# Patient Record
Sex: FEMALE | Race: WHITE | NOT HISPANIC OR LATINO | Employment: FULL TIME | URBAN - METROPOLITAN AREA
[De-identification: names, ages, dates, MRNs, and addresses within clinical notes are randomized per-mention and may not be internally consistent; named-entity substitution may affect disease eponyms.]

---

## 2017-02-01 ENCOUNTER — OFFICE VISIT (OUTPATIENT)
Dept: FAMILY MEDICINE | Facility: CLINIC | Age: 52
End: 2017-02-01
Payer: COMMERCIAL

## 2017-02-01 VITALS
SYSTOLIC BLOOD PRESSURE: 129 MMHG | BODY MASS INDEX: 29.23 KG/M2 | TEMPERATURE: 99 F | OXYGEN SATURATION: 98 % | WEIGHT: 181.88 LBS | DIASTOLIC BLOOD PRESSURE: 80 MMHG | HEART RATE: 90 BPM | HEIGHT: 66 IN

## 2017-02-01 DIAGNOSIS — B00.1 RECURRENT COLD SORES: ICD-10-CM

## 2017-02-01 DIAGNOSIS — J02.9 PHARYNGITIS, UNSPECIFIED ETIOLOGY: Primary | ICD-10-CM

## 2017-02-01 DIAGNOSIS — E78.2 HYPERLIPIDEMIA, MIXED: ICD-10-CM

## 2017-02-01 DIAGNOSIS — J04.0 LARYNGITIS: ICD-10-CM

## 2017-02-01 LAB
CTP QC/QA: YES
S PYO RRNA THROAT QL PROBE: NEGATIVE

## 2017-02-01 PROCEDURE — 99999 PR PBB SHADOW E&M-EST. PATIENT-LVL III: CPT | Mod: PBBFAC,,, | Performed by: NURSE PRACTITIONER

## 2017-02-01 PROCEDURE — 99214 OFFICE O/P EST MOD 30 MIN: CPT | Mod: S$GLB,,, | Performed by: NURSE PRACTITIONER

## 2017-02-01 RX ORDER — VALACYCLOVIR HYDROCHLORIDE 500 MG/1
500 TABLET, FILM COATED ORAL 2 TIMES DAILY
Qty: 180 TABLET | Refills: 3 | Status: SHIPPED | OUTPATIENT
Start: 2017-02-01 | End: 2018-05-02 | Stop reason: SDUPTHER

## 2017-02-01 RX ORDER — ROSUVASTATIN CALCIUM 20 MG/1
20 TABLET, COATED ORAL DAILY
Qty: 90 TABLET | Refills: 0 | Status: SHIPPED | OUTPATIENT
Start: 2017-02-01 | End: 2017-02-13 | Stop reason: SDUPTHER

## 2017-02-01 RX ORDER — FLUTICASONE PROPIONATE 50 MCG
1 SPRAY, SUSPENSION (ML) NASAL DAILY
COMMUNITY
End: 2018-05-02 | Stop reason: ALTCHOICE

## 2017-02-01 RX ORDER — PREDNISONE 20 MG/1
40 TABLET ORAL DAILY
Qty: 10 TABLET | Refills: 0 | Status: SHIPPED | OUTPATIENT
Start: 2017-02-01 | End: 2017-02-06

## 2017-02-01 RX ORDER — AZITHROMYCIN 250 MG/1
TABLET, FILM COATED ORAL
Qty: 6 TABLET | Refills: 0 | Status: SHIPPED | OUTPATIENT
Start: 2017-02-01 | End: 2018-05-02 | Stop reason: ALTCHOICE

## 2017-02-01 NOTE — MR AVS SNAPSHOT
[facility] Medicine  [id number] [place] [address]  [place] [address]  Phone: [phone]  Fax: [phone]                  Niharika Finney   2017 10:40 AM   Office Visit    Description:  Female : 1965   Provider:  Zoraida Helms NP   Department:  [facility]           Reason for Visit     URI     Medication Refill     Laryngitis           Diagnoses this Visit        Comments    Pharyngitis, unspecified etiology    -  Primary     Laryngitis         Recurrent cold sores         Hyperlipidemia, mixed                To Do List           Goals (5 Years of Data)     None      Follow-Up and Disposition     Return in about 3 months (around 2017) for fasting labs and office visit..       These Medications        Disp Refills Start End    rosuvastatin (CRESTOR) 20 MG tablet 90 tablet 0 2017    Take 1 tablet (20 mg total) by mouth once daily. - Oral    Pharmacy: [facility]/pharmacy #[id number] - [place], [place] - [address] [facility] Ph #: [phone]       valacyclovir (VALTREX) 500 MG tablet 180 tablet 3 2017     Take 1 tablet (500 mg total) by mouth 2 (two) times daily. - Oral    Pharmacy: [facility]/pharmacy #[id number] - [place] - [address] Ph #: [phone]       azithromycin (Z-TADEO) 250 MG tablet 6 tablet 0 2017     Take 2 tablets by mouth on day 1; Take 1 tablet by mouth on days 2-5    Pharmacy: [facility]/pharmacy #[id number] - [place] - [address] [facility] Ph #: [phone]       predniSONE (DELTASONE) 20 MG tablet 10 tablet 0 2017    Take 2 tablets (40 mg total) by mouth once daily. - Oral    Pharmacy: [facility]/pharmacy #[id number] - [place] - [address] Ph #: [phone]         [facility]     [facility] Care Line -  Assistance  Registered nurses in the [facility] provide clinical advisement, health education, appointment booking, and other advisory services.  Call for this free service at [phone].             Medications            Message regarding Medications     Verify the changes and/or additions to your medication regime listed below are the same as discussed with your clinician today.  If any of these changes or additions are incorrect, please notify your healthcare provider.        START taking these NEW medications        Refills    rosuvastatin (CRESTOR) 20 MG tablet 0    Sig: Take 1 tablet (20 mg total) by mouth once daily.    Class: Normal    Route: Oral    azithromycin (Z-TADEO) 250 MG tablet 0    Sig: Take 2 tablets by mouth on day 1; Take 1 tablet by mouth on days 2-5    Class: Normal    predniSONE (DELTASONE) 20 MG tablet 0    Sig: Take 2 tablets (40 mg total) by mouth once daily.    Class: Normal    Route: Oral      STOP taking these medications     brompheniramine-pseudoeph-DM 2-30-10 mg/5 mL Syrp Take 10 mLs by mouth every 4 (four) hours as needed.    atorvastatin (LIPITOR) 40 MG tablet Take 1 tablet (40 mg total) by mouth once daily.           Verify that the below list of medications is an accurate representation of the medications you are currently taking.  If none reported, the list may be blank. If incorrect, please contact your healthcare provider. Carry this list with you in case of emergency.           Current Medications     fluticasone (FLONASE) 50 mcg/actuation nasal spray 1 spray by Each Nare route once daily.    valacyclovir (VALTREX) 500 MG tablet Take 1 tablet (500 mg total) by mouth 2 (two) times daily.    azithromycin (Z-TADEO) 250 MG tablet Take 2 tablets by mouth on day 1; Take 1 tablet by mouth on days 2-5    predniSONE (DELTASONE) 20 MG tablet Take 2 tablets (40 mg total) by mouth once daily.    rosuvastatin (CRESTOR) 20 MG tablet Take 1 tablet (20 mg total) by mouth once daily.           Clinical Reference Information           Vital Signs - Last Recorded  Most recent update: 2/1/2017 10:50 AM by Dana Epstein MA    BP Pulse Temp Ht Wt SpO2    129/80 (BP Location: Right arm, Patient Position:  "Sitting, BP Method: Manual) 90 98.8 °F (37.1 °C) (Oral) 5' 6" (1.676 m) 82.5 kg (181 lb 14.1 oz) 98%    BMI                29.36 kg/m2          Blood Pressure          Most Recent Value    BP  129/80      Allergies as of 2/1/2017     No Known Allergies      Immunizations Administered on Date of Encounter - 2/1/2017     None      Orders Placed During Today's Visit      Normal Orders This Visit    POCT rapid strep A       "

## 2017-02-01 NOTE — PROGRESS NOTES
Subjective:       Patient ID: Niharika Finney is a 51 y.o. female.    Chief Complaint: URI (started last Tuesday with hoarseness sore throat, ear pain when swollow and some coughing); Medication Refill; and Laryngitis    HPI Comments: Patient is here today with report she started around 1 week ago with URI s/s but for the past 2 days, severe worsening of sore throat, painful to swallow with complete loss of voice.  See notes below.    When patient was last seen in April 2016, I started patient on Atorvastatin for elevated cholesterol levels.  Patient reports that the Atorvastatin caused myalgia and leg cramps so she stopped taking.  Patient reports she is willing to try Crestor.    Patient has recurrent cold sore and needs refill on valacyclovir.        URI    This is a new problem. Episode onset: started last Tuesday which is now 8 days ago. The problem has been unchanged. There has been no fever. Associated symptoms include congestion, coughing, ear pain, sinus pain and a sore throat. Pertinent negatives include no abdominal pain, chest pain, diarrhea, dysuria, headaches, nausea, rash, rhinorrhea, sneezing, swollen glands or vomiting. Associated symptoms comments: Sore throat much worse yesterday and today - very painful to swallow.  Complete loss of voice.. Treatments tried: Ricola, throat spray, bromfed DM. The treatment provided no relief.       Previous Medications    FLUTICASONE (FLONASE) 50 MCG/ACTUATION NASAL SPRAY    1 spray by Each Nare route once daily.    VALACYCLOVIR (VALTREX) 500 MG TABLET    Take 1 tablet (500 mg total) by mouth 2 (two) times daily.       Past Medical History   Diagnosis Date    ADD (attention deficit disorder with hyperactivity)     Back pain     Herpes labialis     History of gestational diabetes     HLD (hyperlipidemia) 3/13/2013    Impaired fasting glucose 3/13/2013    Sciatica        Past Surgical History   Procedure Laterality Date    Appendectomy      Tonsillectomy       Hernia repair  02/25/16       Family History   Problem Relation Age of Onset    Diabetes Paternal Grandmother     Pancreatic cancer Mother      passed aage 48    Pancreatic cancer Father      passed age 81    Hyperlipidemia Father     Hypertension Father     Gout Father     Cancer Sister      passed away lung cancer age 54    Hyperlipidemia Sister     Hypertension Sister     Heart disease Sister     Coronary artery disease Neg Hx        Social History     Social History    Marital status:      Spouse name: N/A    Number of children: N/A    Years of education: N/A     Social History Main Topics    Smoking status: Never Smoker    Smokeless tobacco: None    Alcohol use No    Drug use: No    Sexual activity: Not Asked     Other Topics Concern    None     Social History Narrative       Review of Systems   Constitutional: Negative for appetite change, chills, fatigue, fever and unexpected weight change.   HENT: Positive for congestion, ear pain, postnasal drip, sore throat and voice change. Negative for mouth sores, nosebleeds, rhinorrhea, sinus pressure, sneezing and trouble swallowing.    Eyes: Negative for photophobia, pain, discharge, redness, itching and visual disturbance.   Respiratory: Positive for cough. Negative for chest tightness and shortness of breath.    Cardiovascular: Negative for chest pain, palpitations and leg swelling.   Gastrointestinal: Negative for abdominal pain, blood in stool, constipation, diarrhea, nausea and vomiting.   Genitourinary: Negative for dysuria, frequency, hematuria and urgency.   Musculoskeletal: Negative for arthralgias, back pain, joint swelling and myalgias.   Skin: Negative for color change and rash.   Allergic/Immunologic: Negative for immunocompromised state.   Neurological: Negative for dizziness, seizures, syncope, weakness and headaches.   Hematological: Negative for adenopathy. Does not bruise/bleed easily.   Psychiatric/Behavioral:  "Negative for agitation, dysphoric mood, sleep disturbance and suicidal ideas. The patient is not nervous/anxious.          Objective:     Vitals:    02/01/17 1045   BP: 129/80   BP Location: Right arm   Patient Position: Sitting   BP Method: Manual   Pulse: 90   Temp: 98.8 °F (37.1 °C)   TempSrc: Oral   SpO2: 98%   Weight: 82.5 kg (181 lb 14.1 oz)   Height: 5' 6" (1.676 m)          Physical Exam   Constitutional: She is oriented to person, place, and time. She appears well-developed and well-nourished. No distress.   + obesity with Body mass index is 31.42 kg/(m^2).     HENT:   Head: Normocephalic and atraumatic.   Right Ear: External ear normal.   Left Ear: External ear normal.   Nose: Mucosal edema and rhinorrhea present.   Mouth/Throat: No uvula swelling. Posterior oropharyngeal edema and posterior oropharyngeal erythema present. No oropharyngeal exudate.       Eyes: EOM are normal. Pupils are equal, round, and reactive to light.   Neck: Normal range of motion. Neck supple. No tracheal deviation present. No thyromegaly present.   Cardiovascular: Normal rate, regular rhythm and normal heart sounds.    No murmur heard.  Pulmonary/Chest: Effort normal and breath sounds normal. No respiratory distress.   Abdominal: Soft. She exhibits no distension.   Musculoskeletal: Normal range of motion. She exhibits no edema.   Lymphadenopathy:     She has no cervical adenopathy.   Neurological: She is alert and oriented to person, place, and time. No cranial nerve deficit. Coordination normal.   Skin: Skin is warm and dry. No rash noted. She is not diaphoretic.   Psychiatric: She has a normal mood and affect.       Office Visit on 02/01/2017   Component Date Value Ref Range Status    Rapid Strep A Screen 02/01/2017 Negative  Negative Final     Acceptable 02/01/2017 Yes   Final       Assessment:         ICD-10-CM ICD-9-CM   1. Pharyngitis, unspecified etiology J02.9 462   2. Laryngitis J04.0 464.00   3. Recurrent " cold sores B00.1 054.9   4. Hyperlipidemia, mixed E78.2 272.2       Plan:       Pharyngitis, unspecified etiology  -  Strep screen negative but other URI s/s going on for over 7 days and worsening - will treat with zpak and prednisone for 5 days.  Treat symptoms with OTC medications.  -     POCT rapid strep A  -     azithromycin (Z-TADEO) 250 MG tablet; Take 2 tablets by mouth on day 1; Take 1 tablet by mouth on days 2-5  Dispense: 6 tablet; Refill: 0  -     predniSONE (DELTASONE) 20 MG tablet; Take 2 tablets (40 mg total) by mouth once daily.  Dispense: 10 tablet; Refill: 0    Laryngitis    Recurrent cold sores  -  Take the Valtrex as needed.  -     valacyclovir (VALTREX) 500 MG tablet; Take 1 tablet (500 mg total) by mouth 2 (two) times daily.  Dispense: 180 tablet; Refill: 3    Hyperlipidemia, mixed  -  Take Crestor daily.  Recheck fasting labs and office visit in 3 months.  -     rosuvastatin (CRESTOR) 20 MG tablet; Take 1 tablet (20 mg total) by mouth once daily.  Dispense: 90 tablet; Refill: 0    Return in about 3 months (around 5/1/2017) for fasting labs and office visit..     Patient's Medications   New Prescriptions    AZITHROMYCIN (Z-TADEO) 250 MG TABLET    Take 2 tablets by mouth on day 1; Take 1 tablet by mouth on days 2-5    PREDNISONE (DELTASONE) 20 MG TABLET    Take 2 tablets (40 mg total) by mouth once daily.    ROSUVASTATIN (CRESTOR) 20 MG TABLET    Take 1 tablet (20 mg total) by mouth once daily.   Previous Medications    FLUTICASONE (FLONASE) 50 MCG/ACTUATION NASAL SPRAY    1 spray by Each Nare route once daily.   Modified Medications    Modified Medication Previous Medication    VALACYCLOVIR (VALTREX) 500 MG TABLET valacyclovir (VALTREX) 500 MG tablet       Take 1 tablet (500 mg total) by mouth 2 (two) times daily.    Take 1 tablet (500 mg total) by mouth 2 (two) times daily.   Discontinued Medications    ATORVASTATIN (LIPITOR) 40 MG TABLET    Take 1 tablet (40 mg total) by mouth once daily.     BROMPHENIRAMINE-PSEUDOEPH-DM 2-30-10 MG/5 ML SYRP    Take 10 mLs by mouth every 4 (four) hours as needed.

## 2017-02-13 ENCOUNTER — TELEPHONE (OUTPATIENT)
Dept: FAMILY MEDICINE | Facility: CLINIC | Age: 52
End: 2017-02-13

## 2017-02-13 RX ORDER — ROSUVASTATIN CALCIUM 10 MG/1
10 TABLET, COATED ORAL DAILY
Qty: 90 TABLET | Refills: 0 | Status: SHIPPED | OUTPATIENT
Start: 2017-02-13 | End: 2017-05-21 | Stop reason: SDUPTHER

## 2017-02-13 NOTE — TELEPHONE ENCOUNTER
Called pt no answer left message on voice mail that medication was decrease to 10 mg and prescription was sent to pharmacy.

## 2017-02-13 NOTE — TELEPHONE ENCOUNTER
----- Message from Carmen Lynch sent at 2/13/2017  9:48 AM CST -----  Contact: [phone]/[phone]   Pt states the medication rosuvastatin (CRESTOR) 20 MG tablet is too strong and would like to know if the Dr could lower the MG.  Please advise

## 2017-05-23 RX ORDER — ROSUVASTATIN CALCIUM 10 MG/1
10 TABLET, COATED ORAL DAILY
Qty: 30 TABLET | Refills: 0 | Status: SHIPPED | OUTPATIENT
Start: 2017-05-23 | End: 2017-06-20 | Stop reason: SDUPTHER

## 2017-05-23 NOTE — TELEPHONE ENCOUNTER
Advise patient she is due for fasting labs and office visit to check her cholesterol labs since starting the Crestor - schedule appointment for labs and then visit. I filled a 1 month supply

## 2017-06-20 ENCOUNTER — PATIENT MESSAGE (OUTPATIENT)
Dept: FAMILY MEDICINE | Facility: CLINIC | Age: 52
End: 2017-06-20

## 2017-06-20 RX ORDER — ROSUVASTATIN CALCIUM 10 MG/1
10 TABLET, COATED ORAL DAILY
Qty: 30 TABLET | Refills: 3 | Status: SHIPPED | OUTPATIENT
Start: 2017-06-20 | End: 2017-12-19 | Stop reason: SDUPTHER

## 2017-12-19 RX ORDER — ROSUVASTATIN CALCIUM 10 MG/1
10 TABLET, COATED ORAL DAILY
Qty: 30 TABLET | Refills: 0 | Status: SHIPPED | OUTPATIENT
Start: 2017-12-19 | End: 2018-05-02 | Stop reason: SDUPTHER

## 2017-12-20 NOTE — TELEPHONE ENCOUNTER
----- Message from Nando Mackey sent at 12/20/2017 12:39 PM CST -----  Contact: [phone] [facility]  Pharmacy advised the patient insurance is requesting a 3 month supply for the rosuvastatin (CRESTOR) 10 MG tablet. Please call and advise.

## 2018-03-15 ENCOUNTER — TELEPHONE (OUTPATIENT)
Dept: FAMILY MEDICINE | Facility: CLINIC | Age: 53
End: 2018-03-15

## 2018-03-15 NOTE — TELEPHONE ENCOUNTER
Advise patient that we have a new orthopedic MD in [place] that lower extremities are his specialty - Dr. Burk - that is who I would recommend seeing - jeremy can help her to schedule new patient appointment with him.

## 2018-03-15 NOTE — TELEPHONE ENCOUNTER
----- Message from Paige Hill sent at 3/15/2018  8:26 AM CDT -----  No. [phone]    Patient's right ankle is swollen.  Also, she has pain in the back of her left knee and left leg.   It is painful for her to walk.   What physician should she see.   Please call.

## 2018-04-27 LAB
CHOLEST SERPL-MSCNC: 245 MG/DL (ref 0–200)
GLUCOSE SERPL-MCNC: 97 MG/DL (ref 70–110)
HDLC SERPL-MCNC: 45 MG/DL
LDLC SERPL CALC-MCNC: 170 MG/DL
TOTAL HDL-C DIRECT: 5.5
TRIGL SERPL-MCNC: 150 MG/DL

## 2018-05-02 ENCOUNTER — TELEPHONE (OUTPATIENT)
Dept: ADMINISTRATIVE | Facility: HOSPITAL | Age: 53
End: 2018-05-02

## 2018-05-02 ENCOUNTER — OFFICE VISIT (OUTPATIENT)
Dept: FAMILY MEDICINE | Facility: CLINIC | Age: 53
End: 2018-05-02
Payer: COMMERCIAL

## 2018-05-02 VITALS
SYSTOLIC BLOOD PRESSURE: 126 MMHG | WEIGHT: 206.25 LBS | HEIGHT: 66 IN | DIASTOLIC BLOOD PRESSURE: 86 MMHG | HEART RATE: 80 BPM | TEMPERATURE: 99 F | OXYGEN SATURATION: 98 % | BODY MASS INDEX: 33.15 KG/M2

## 2018-05-02 DIAGNOSIS — Z12.39 BREAST CANCER SCREENING: ICD-10-CM

## 2018-05-02 DIAGNOSIS — Z12.11 COLON CANCER SCREENING: ICD-10-CM

## 2018-05-02 DIAGNOSIS — E78.2 HYPERLIPIDEMIA, MIXED: Primary | ICD-10-CM

## 2018-05-02 DIAGNOSIS — B00.1 RECURRENT COLD SORES: ICD-10-CM

## 2018-05-02 PROCEDURE — 99999 PR PBB SHADOW E&M-EST. PATIENT-LVL V: CPT | Mod: PBBFAC,,, | Performed by: NURSE PRACTITIONER

## 2018-05-02 PROCEDURE — 99214 OFFICE O/P EST MOD 30 MIN: CPT | Mod: SA,S$GLB,, | Performed by: NURSE PRACTITIONER

## 2018-05-02 RX ORDER — ROSUVASTATIN CALCIUM 10 MG/1
10 TABLET, COATED ORAL DAILY
Qty: 90 TABLET | Refills: 1 | Status: SHIPPED | OUTPATIENT
Start: 2018-05-02 | End: 2018-11-07 | Stop reason: SDUPTHER

## 2018-05-02 RX ORDER — VALACYCLOVIR HYDROCHLORIDE 500 MG/1
500 TABLET, FILM COATED ORAL 2 TIMES DAILY
Qty: 180 TABLET | Refills: 3 | Status: SHIPPED | OUTPATIENT
Start: 2018-05-02 | End: 2019-12-05 | Stop reason: SDUPTHER

## 2018-05-02 NOTE — PROGRESS NOTES
Subjective:       Patient ID: Niharika Finney is a 52 y.o. female.    Chief Complaint: Medication Refill    Patient is a 52-year-old white female with a history of gestational diabetes, hyperlipidemia, and recurrent cold sores that is here today to follow-up after wellness exam done at [facility].    Patient has hyperlipidemia and was started on Crestor 10 mg back in February 2017 the patient did not return for follow-up visit and repeat lab work so medication was not filled and patient had to stop taking.  On wellness exam with [facility] her total cholesterol was 245 with LDL level 170.  HDL was 45 with triglycerides 150.  Again patient was off of medication because she had ran out.  The [facility] does not do kidney and liver function exams.  Advised patient I will put her back on Crestor 10 mg daily but she must follow-up with fasting CMP and lipid panel in the next 3 months to reassess.    Patient has recurrent cold sores and takes valacyclovir as needed.    Patient is overdue for colonoscopy screening.  Like to have done in [place].  Will send referral to Dr. Burk.    Patient is due for mammogram.  Mammogram order given.  States she will schedule in [place].        Previous Medications    ROSUVASTATIN (CRESTOR) 10 MG TABLET    Take 1 tablet (10 mg total) by mouth once daily.    VALACYCLOVIR (VALTREX) 500 MG TABLET    Take 1 tablet (500 mg total) by mouth 2 (two) times daily.       Past Medical History:   Diagnosis Date    ADD (attention deficit disorder with hyperactivity)     Back pain     Herpes labialis     History of gestational diabetes     HLD (hyperlipidemia) 3/13/2013    Impaired fasting glucose 3/13/2013    Recurrent cold sores     Sciatica        Past Surgical History:   Procedure Laterality Date    APPENDECTOMY      HERNIA REPAIR  02/25/16    TONSILLECTOMY         Family History   Problem Relation Age of Onset    Diabetes Paternal Grandmother     Pancreatic cancer  Mother      passed aage 48    Pancreatic cancer Father      passed age 81    Hyperlipidemia Father     Hypertension Father     Gout Father     Cancer Sister      passed away lung cancer age 54    Hyperlipidemia Sister     Hypertension Sister     Heart disease Sister     Coronary artery disease Neg Hx        Social History     Social History    Marital status:      Spouse name: N/A    Number of children: N/A    Years of education: N/A     Social History Main Topics    Smoking status: Never Smoker    Smokeless tobacco: None    Alcohol use No    Drug use: No    Sexual activity: Not Asked     Other Topics Concern    None     Social History Narrative    None       Review of Systems   Constitutional: Negative for appetite change, chills, fatigue, fever and unexpected weight change.   HENT: Negative for congestion, ear pain, mouth sores, nosebleeds, postnasal drip, rhinorrhea, sinus pressure, sneezing, sore throat, trouble swallowing and voice change.    Eyes: Negative for photophobia, pain, discharge, redness, itching and visual disturbance.   Respiratory: Negative for cough, chest tightness and shortness of breath.    Cardiovascular: Negative for chest pain, palpitations and leg swelling.   Gastrointestinal: Negative for abdominal pain, blood in stool, constipation, diarrhea, nausea and vomiting.   Genitourinary: Negative for dysuria, frequency, hematuria and urgency.   Musculoskeletal: Negative for arthralgias, back pain, joint swelling and myalgias.   Skin: Negative for color change and rash.   Allergic/Immunologic: Negative for immunocompromised state.   Neurological: Negative for dizziness, seizures, syncope, weakness and headaches.   Hematological: Negative for adenopathy. Does not bruise/bleed easily.   Psychiatric/Behavioral: Negative for agitation, dysphoric mood, sleep disturbance and suicidal ideas. The patient is not nervous/anxious.          Objective:     Vitals:    05/02/18 0849  "05/02/18 0919   BP: (!) 128/94 126/86   BP Location: Right arm    Patient Position: Sitting    BP Method: Medium (Manual)    Pulse: 80    Temp: 98.7 °F (37.1 °C)    TempSrc: Oral    SpO2: 98%    Weight: 93.6 kg (206 lb 3.9 oz)    Height: 5' 6" (1.676 m)           Physical Exam   Constitutional: She is oriented to person, place, and time. She appears well-developed and well-nourished.   + obesity with Body mass index is 33.29 kg/m².   HENT:   Head: Normocephalic and atraumatic.   Right Ear: External ear normal.   Left Ear: External ear normal.   Nose: Nose normal.   Mouth/Throat: Oropharynx is clear and moist. No oropharyngeal exudate.   Eyes: EOM are normal. Pupils are equal, round, and reactive to light.   Neck: Normal range of motion. Neck supple. No tracheal deviation present. No thyromegaly present.   Cardiovascular: Normal rate, regular rhythm and normal heart sounds.    No murmur heard.  Pulmonary/Chest: Effort normal and breath sounds normal. No respiratory distress.   Abdominal: Soft. She exhibits no distension.   Musculoskeletal: Normal range of motion. She exhibits no edema.   Lymphadenopathy:     She has no cervical adenopathy.   Neurological: She is alert and oriented to person, place, and time. No cranial nerve deficit. Coordination normal.   Skin: Skin is warm and dry. No rash noted.   Psychiatric: She has a normal mood and affect.         Assessment:         ICD-10-CM ICD-9-CM   1. Hyperlipidemia, mixed E78.2 272.2   2. Recurrent cold sores B00.1 054.9   3. Breast cancer screening Z12.31 V76.10   4. Colon cancer screening Z12.11 V76.51       Plan:       Hyperlipidemia, mixed  -  Get back on Crestor 10 mg daily and take every day.  [facility] and return to office for follow-up in 3 months.  -     rosuvastatin (CRESTOR) 10 MG tablet; Take 1 tablet (10 mg total) by mouth once daily.  Dispense: 90 tablet; Refill: 1  -     Comprehensive metabolic panel; Future; Expected date: 08/02/2018  -   "   Lipid panel; Future; Expected date: 08/02/2018    Recurrent cold sores  -  Take as needed  -     valACYclovir (VALTREX) 500 MG tablet; Take 1 tablet (500 mg total) by mouth 2 (two) times daily.  Dispense: 180 tablet; Refill: 3    Breast cancer screening  -  Call and schedule at [facility]  -     Mammo Digital Screening Bilateral With CAD; Future; Expected date: 05/02/2018    Colon cancer screening  -  Message sent over portal to Dr. Burk staff to contact patient to schedule an [facility].  -     Ambulatory Referral to Gastroenterology      Follow-up in about 3 months (around 8/2/2018) for fasting labs at [facility] and then follow up for results..     Patient's Medications   New Prescriptions    No medications on file   Previous Medications    No medications on file   Modified Medications    Modified Medication Previous Medication    ROSUVASTATIN (CRESTOR) 10 MG TABLET rosuvastatin (CRESTOR) 10 MG tablet       Take 1 tablet (10 mg total) by mouth once daily.    Take 1 tablet (10 mg total) by mouth once daily.    VALACYCLOVIR (VALTREX) 500 MG TABLET valacyclovir (VALTREX) 500 MG tablet       Take 1 tablet (500 mg total) by mouth 2 (two) times daily.    Take 1 tablet (500 mg total) by mouth 2 (two) times daily.   Discontinued Medications    AZITHROMYCIN (Z-TADEO) 250 MG TABLET    Take 2 tablets by mouth on day 1; Take 1 tablet by mouth on days 2-5    FLUTICASONE (FLONASE) 50 MCG/ACTUATION NASAL SPRAY    1 spray by Each Nare route once daily.

## 2018-05-09 ENCOUNTER — TELEPHONE (OUTPATIENT)
Dept: GASTROENTEROLOGY | Facility: CLINIC | Age: 53
End: 2018-05-09

## 2018-11-07 DIAGNOSIS — E78.2 HYPERLIPIDEMIA, MIXED: ICD-10-CM

## 2018-11-07 RX ORDER — ROSUVASTATIN CALCIUM 10 MG/1
TABLET, COATED ORAL
Qty: 90 TABLET | Refills: 0 | Status: SHIPPED | OUTPATIENT
Start: 2018-11-07 | End: 2019-02-20 | Stop reason: ALTCHOICE

## 2018-11-14 ENCOUNTER — TELEPHONE (OUTPATIENT)
Dept: FAMILY MEDICINE | Facility: CLINIC | Age: 53
End: 2018-11-14

## 2018-11-14 NOTE — TELEPHONE ENCOUNTER
----- Message from Nikita Epstein sent at 11/14/2018 12:45 PM CST -----  Contact: [phone]  Patient called to speak with your office about the issues she has been having for the past week.  Diarrhea, nausea, throwing up and body aches among other things is what she is experiencing.    Please call [phone] and advise what she needs to do to get better.

## 2018-11-15 NOTE — TELEPHONE ENCOUNTER
Called patient with no answer - I see I have a 10:20 open tomorrow but would not let me schedule it because it states [facility] [employer] and will not let me schedule even though I put in as same day appt.  If it is still available - schedule her appt.  If not available - check to see if Dr. Jackson has any openings to see patient please

## 2018-11-15 NOTE — TELEPHONE ENCOUNTER
Called patient about no openings today with cheli - told patient I could schedule her with dr alfaro. Patient stated she would have to call us back this afternoon if she decides.  I explained I could make it now and always cancel the appt, patient state no she would call us back

## 2019-02-20 ENCOUNTER — OFFICE VISIT (OUTPATIENT)
Dept: FAMILY MEDICINE | Facility: CLINIC | Age: 54
End: 2019-02-20
Payer: COMMERCIAL

## 2019-02-20 VITALS
BODY MASS INDEX: 33.55 KG/M2 | HEIGHT: 66 IN | TEMPERATURE: 98 F | OXYGEN SATURATION: 98 % | RESPIRATION RATE: 18 BRPM | DIASTOLIC BLOOD PRESSURE: 88 MMHG | WEIGHT: 208.75 LBS | HEART RATE: 89 BPM | SYSTOLIC BLOOD PRESSURE: 146 MMHG

## 2019-02-20 DIAGNOSIS — B00.1 RECURRENT COLD SORES: ICD-10-CM

## 2019-02-20 DIAGNOSIS — Z13.1 DIABETES MELLITUS SCREENING: ICD-10-CM

## 2019-02-20 DIAGNOSIS — Z13.29 THYROID DISORDER SCREEN: ICD-10-CM

## 2019-02-20 DIAGNOSIS — J18.9 COMMUNITY ACQUIRED PNEUMONIA, UNSPECIFIED LATERALITY: Primary | ICD-10-CM

## 2019-02-20 DIAGNOSIS — Z12.11 COLON CANCER SCREENING: ICD-10-CM

## 2019-02-20 DIAGNOSIS — E78.2 HYPERLIPIDEMIA, MIXED: ICD-10-CM

## 2019-02-20 DIAGNOSIS — Z13.0 SCREENING FOR DEFICIENCY ANEMIA: ICD-10-CM

## 2019-02-20 PROCEDURE — 99214 PR OFFICE/OUTPT VISIT, EST, LEVL IV, 30-39 MIN: ICD-10-PCS | Mod: S$GLB,,, | Performed by: NURSE PRACTITIONER

## 2019-02-20 PROCEDURE — 99214 OFFICE O/P EST MOD 30 MIN: CPT | Mod: S$GLB,,, | Performed by: NURSE PRACTITIONER

## 2019-02-20 PROCEDURE — 99999 PR PBB SHADOW E&M-EST. PATIENT-LVL IV: CPT | Mod: PBBFAC,,, | Performed by: NURSE PRACTITIONER

## 2019-02-20 PROCEDURE — 3008F BODY MASS INDEX DOCD: CPT | Mod: CPTII,S$GLB,, | Performed by: NURSE PRACTITIONER

## 2019-02-20 PROCEDURE — 99999 PR PBB SHADOW E&M-EST. PATIENT-LVL IV: ICD-10-PCS | Mod: PBBFAC,,, | Performed by: NURSE PRACTITIONER

## 2019-02-20 PROCEDURE — 3008F PR BODY MASS INDEX (BMI) DOCUMENTED: ICD-10-PCS | Mod: CPTII,S$GLB,, | Performed by: NURSE PRACTITIONER

## 2019-02-20 RX ORDER — AZITHROMYCIN 250 MG/1
TABLET, FILM COATED ORAL
Refills: 0 | COMMUNITY
Start: 2019-02-17 | End: 2019-03-12 | Stop reason: ALTCHOICE

## 2019-02-20 RX ORDER — BROMPHENIRAMINE MALEATE, PSEUDOEPHEDRINE HYDROCHLORIDE, AND DEXTROMETHORPHAN HYDROBROMIDE 2; 30; 10 MG/5ML; MG/5ML; MG/5ML
10 SYRUP ORAL EVERY 4 HOURS PRN
Qty: 240 ML | Refills: 0 | Status: SHIPPED | OUTPATIENT
Start: 2019-02-20 | End: 2019-03-12 | Stop reason: ALTCHOICE

## 2019-02-20 NOTE — PROGRESS NOTES
Subjective:       Patient ID: Niharika Finney is a 53 y.o. female.    Chief Complaint: Pneumonia    Patient is a 52-year-old white female with a history of gestational diabetes, hyperlipidemia, and recurrent cold sores that is here today with report she was diagnosed with Pneumonia at [facility] and here for follow up.    Patient reports she started on Friday 2/15/2019 with report of nonproductive cough and chills, no fever.  By Sunday, 2/17/2019 - reports temperature of 102.5 with worsening of symptoms.  Her son was diagnosed with the FLU earlier in the week.  Patient went to [facility]. She reports her flu swab was negative but Chest xray showed patchy and nodular opacity projected over the lingula.  Atelectasis or infiltrate or scarring is possible.  Lung nodule not excluded.  Was given a Rocephin 1 gram injection at urgent Care and discharged on po Zithromax.  Patient reports the fever resolved on yesterday.  Reports that today is the first day that she is feeling somewhat better but still has chest pains with cough.  HR and pulse ox stable.  BBS CTA.  Will continue the Zpak until completed.  Treat the symptoms of URI with Bromfed DM and Ibuprofen and see patient back in 2 weeks with repeat CXR.  Return here if symptoms worsen prior to this follow up.  OFF WORK WITH REST AND INCREASED FLUIDS UNTIL Monday 2/25/2019.    Patient has Hyperlipidemia.  She reports she was intolerant to Atorvastatin and Rosuvastatin due to body aches/joint pains.  She has not tried other statins.  Recommended patient return for fasting labs to reassess and try a different statin such as simvastatin or Zetia.  Patient states that she will go get labs and return for WELLNESS EXAM to discuss.    Due for mammogram.  Due for PAP - advised to make appt.    Due for colon cancer screen.  Refused colonoscopy but agrees to fit testing. FitKit was given to patient on 2/20/2019 3:01 PM             Current Outpatient  Medications   Medication Sig Dispense Refill    azithromycin (Z-TADEO) 250 MG tablet TABLETS BY MOUTH TAKE 2 TABLETS BY MOUTH ON DAY 1, TAKE 1 TABLET BY MOUTH DAYS 2-5  0    valACYclovir (VALTREX) 500 MG tablet Take 1 tablet (500 mg total) by mouth 2 (two) times daily. 180 tablet 3     No current facility-administered medications for this visit.        Past Medical History:   Diagnosis Date    ADD (attention deficit disorder with hyperactivity)     Back pain     Herpes labialis     History of gestational diabetes     HLD (hyperlipidemia) 3/13/2013    Impaired fasting glucose 3/13/2013    Recurrent cold sores     Sciatica        Past Surgical History:   Procedure Laterality Date    APPENDECTOMY      HERNIA REPAIR  02/25/16    REPAIR-HERNIA-INCISIONAL ventral ex mesh N/A 1/28/2016    [facility]    TONSILLECTOMY         Family History   Problem Relation Age of Onset    Diabetes Paternal Grandmother     Pancreatic cancer Mother         passed aage 48    Pancreatic cancer Father         passed age 81    Hyperlipidemia Father     Hypertension Father     Gout Father     Cancer Sister         passed away lung cancer age 54    Hyperlipidemia Sister     Hypertension Sister     Heart disease Sister     Coronary artery disease Neg Hx        Social History     Socioeconomic History    Marital status:      Spouse name: None    Number of children: None    Years of education: None    Highest education level: None   Social Needs    Financial resource strain: None    Food insecurity - worry: None    Food insecurity - inability: None    Transportation needs - medical: None    Transportation needs - non-medical: None   Occupational History    None   Tobacco Use    Smoking status: Never Smoker    Smokeless tobacco: Never Used   Substance and Sexual Activity    Alcohol use: No    Drug use: No    Sexual activity: None   Other Topics Concern    None   Social  "History Narrative    None       Review of Systems   Constitutional: Positive for fatigue. Negative for appetite change, chills, fever and unexpected weight change.   HENT: Positive for congestion, postnasal drip and sinus pressure. Negative for ear pain, mouth sores, nosebleeds, rhinorrhea, sneezing, sore throat, trouble swallowing and voice change.    Eyes: Negative for photophobia, pain, discharge, redness, itching and visual disturbance.   Respiratory: Positive for cough and chest tightness. Negative for shortness of breath.    Cardiovascular: Positive for chest pain. Negative for palpitations and leg swelling.        Chest pain is with cough   Gastrointestinal: Negative for abdominal pain, blood in stool, constipation, diarrhea, nausea and vomiting.   Genitourinary: Negative for dysuria, frequency, hematuria and urgency.   Musculoskeletal: Negative for arthralgias, back pain, joint swelling and myalgias.   Skin: Negative for color change and rash.   Allergic/Immunologic: Negative for immunocompromised state.   Neurological: Negative for dizziness, seizures, syncope, weakness and headaches.   Hematological: Negative for adenopathy. Does not bruise/bleed easily.   Psychiatric/Behavioral: Negative for agitation, dysphoric mood, sleep disturbance and suicidal ideas. The patient is not nervous/anxious.          Objective:     Vitals:    02/20/19 1409 02/20/19 1410   BP: (!) 148/92 (!) 146/88   BP Location: Left arm Right arm   Patient Position: Sitting Sitting   BP Method: Large (Manual) Large (Manual)   Pulse: 89    Resp: 18    Temp: 98.3 °F (36.8 °C)    TempSrc: Oral    SpO2: 98%    Weight: 94.7 kg (208 lb 12.4 oz)    Height: 5' 6" (1.676 m)           Physical Exam   Constitutional: She is oriented to person, place, and time. She appears well-developed and well-nourished.   + obesity with Body mass index is 33.7 kg/m².     HENT:   Head: Normocephalic and atraumatic.   Right Ear: External ear normal.   Left Ear: " External ear normal.   Nose: Mucosal edema and rhinorrhea present.   Mouth/Throat: Posterior oropharyngeal erythema present. No oropharyngeal exudate or posterior oropharyngeal edema.   Eyes: EOM are normal. Pupils are equal, round, and reactive to light.   Neck: Normal range of motion. Neck supple. No tracheal deviation present. No thyromegaly present.   Cardiovascular: Normal rate, regular rhythm and normal heart sounds.   No murmur heard.  Pulmonary/Chest: Effort normal and breath sounds normal. No stridor. No tachypnea. No respiratory distress. She has no wheezes. She has no rhonchi. She has no rales.   BBS CTA.  Pulse ox 98% RA.  She does have an expiratory-exacerbated cough - indicator of bronchitis.     Abdominal: Soft. She exhibits no distension.   Musculoskeletal: Normal range of motion. She exhibits no edema.   Lymphadenopathy:     She has no cervical adenopathy.   Neurological: She is alert and oriented to person, place, and time. No cranial nerve deficit. Coordination normal.   Skin: Skin is warm and dry. No rash noted.   Psychiatric: She has a normal mood and affect.         Assessment:         ICD-10-CM ICD-9-CM   1. Community acquired pneumonia, unspecified laterality J18.9 486   2. Hyperlipidemia, mixed E78.2 272.2   3. Recurrent cold sores B00.1 054.9   4. Colon cancer screening Z12.11 V76.51   5. Screening for deficiency anemia Z13.0 V78.1   6. Thyroid disorder screen Z13.29 V77.0   7. Diabetes mellitus screening Z13.1 V77.1       Plan:       Community acquired pneumonia, unspecified laterality  -  Finish off the Zpak.  HOME UNTIL Monday WITH PLENTY OF REST AND FLUIDS.  Take Bromfed DM and Ibuprofen for symptom management.  Use Flonase to decrease the nasal turbinate swelling.  -     X-Ray Chest PA And Lateral; Future; Expected date: 02/20/2019  -     brompheniramine-pseudoeph-DM (BROMFED DM) 2-30-10 mg/5 mL Syrp; Take 10 mLs by mouth every 4 (four) hours as needed (congestion/cough).  Dispense:  240 mL; Refill: 0    Hyperlipidemia, mixed  -  Get labs in 2 weeks for fasting labs and wellness exam  -     Lipid panel; Future; Expected date: 02/20/2019    Recurrent cold sores  -  Take valtrex prn    Colon cancer screening  -  Fit test given to patient and asked her to complete prior to next visit.  -     Fecal Immunochemical Test (iFOBT); Future; Expected date: 02/20/2019    Screening for deficiency anemia  -     CBC auto differential; Future; Expected date: 02/20/2019    Thyroid disorder screen  -     TSH; Future; Expected date: 02/20/2019    Diabetes mellitus screening  -     Comprehensive metabolic panel; Future; Expected date: 02/20/2019      Follow-up for 2 weeks for fasting labs and then WELLNESS EXAM.     Patient's Medications   New Prescriptions    BROMPHENIRAMINE-PSEUDOEPH-DM (BROMFED DM) 2-30-10 MG/5 ML SYRP    Take 10 mLs by mouth every 4 (four) hours as needed (congestion/cough).   Previous Medications    AZITHROMYCIN (Z-TADEO) 250 MG TABLET    TABLETS BY MOUTH TAKE 2 TABLETS BY MOUTH ON DAY 1, TAKE 1 TABLET BY MOUTH DAYS 2-5    VALACYCLOVIR (VALTREX) 500 MG TABLET    Take 1 tablet (500 mg total) by mouth 2 (two) times daily.   Modified Medications    No medications on file   Discontinued Medications    ROSUVASTATIN (CRESTOR) 10 MG TABLET    TAKE 1 TABLET EVERY DAY

## 2019-03-08 ENCOUNTER — HOSPITAL ENCOUNTER (OUTPATIENT)
Dept: RADIOLOGY | Facility: HOSPITAL | Age: 54
Discharge: HOME | End: 2019-03-08
Attending: NURSE PRACTITIONER
Payer: COMMERCIAL

## 2019-03-08 DIAGNOSIS — J18.9 COMMUNITY ACQUIRED PNEUMONIA, UNSPECIFIED LATERALITY: ICD-10-CM

## 2019-03-08 DIAGNOSIS — Z12.39 BREAST CANCER SCREENING: ICD-10-CM

## 2019-03-08 PROCEDURE — 77067 SCR MAMMO BI INCL CAD: CPT | Mod: TC,PO

## 2019-03-08 PROCEDURE — 71046 X-RAY EXAM CHEST 2 VIEWS: CPT | Mod: TC,FY,PO

## 2019-03-09 LAB
ALBUMIN SERPL-MCNC: 4 G/DL (ref 3.6–5.1)
ALBUMIN/GLOB SERPL: 1.7 (CALC) (ref 1–2.5)
ALP SERPL-CCNC: 96 U/L (ref 33–130)
ALT SERPL-CCNC: 272 U/L (ref 6–29)
AST SERPL-CCNC: 190 U/L (ref 10–35)
BILIRUB SERPL-MCNC: 0.8 MG/DL (ref 0.2–1.2)
BUN SERPL-MCNC: 17 MG/DL (ref 7–25)
BUN/CREAT SERPL: ABNORMAL (CALC) (ref 6–22)
CALCIUM SERPL-MCNC: 9.1 MG/DL (ref 8.6–10.4)
CHLORIDE SERPL-SCNC: 106 MMOL/L (ref 98–110)
CHOLEST SERPL-MCNC: 225 MG/DL
CHOLEST/HDLC SERPL: 4.6 (CALC)
CO2 SERPL-SCNC: 26 MMOL/L (ref 20–32)
CREAT SERPL-MCNC: 0.65 MG/DL (ref 0.5–1.05)
GFRSERPLBLD MDRD-ARVRAT: 102 ML/MIN/1.73M2
GLOBULIN SER CALC-MCNC: 2.4 G/DL (CALC) (ref 1.9–3.7)
GLUCOSE SERPL-MCNC: 114 MG/DL (ref 65–99)
HDLC SERPL-MCNC: 49 MG/DL
LDLC SERPL CALC-MCNC: 151 MG/DL (CALC)
NONHDLC SERPL-MCNC: 176 MG/DL (CALC)
POTASSIUM SERPL-SCNC: 4 MMOL/L (ref 3.5–5.3)
PROT SERPL-MCNC: 6.4 G/DL (ref 6.1–8.1)
SODIUM SERPL-SCNC: 140 MMOL/L (ref 135–146)
TRIGL SERPL-MCNC: 127 MG/DL

## 2019-03-12 ENCOUNTER — OFFICE VISIT (OUTPATIENT)
Dept: FAMILY MEDICINE | Facility: CLINIC | Age: 54
End: 2019-03-12
Payer: COMMERCIAL

## 2019-03-12 VITALS
BODY MASS INDEX: 35.51 KG/M2 | SYSTOLIC BLOOD PRESSURE: 134 MMHG | TEMPERATURE: 98 F | OXYGEN SATURATION: 98 % | HEART RATE: 71 BPM | DIASTOLIC BLOOD PRESSURE: 86 MMHG | WEIGHT: 208 LBS | RESPIRATION RATE: 18 BRPM | HEIGHT: 64 IN

## 2019-03-12 DIAGNOSIS — Z87.01 HISTORY OF PNEUMONIA: ICD-10-CM

## 2019-03-12 DIAGNOSIS — R79.89 ELEVATED LFTS: ICD-10-CM

## 2019-03-12 DIAGNOSIS — R03.0 BORDERLINE HIGH BLOOD PRESSURE: ICD-10-CM

## 2019-03-12 DIAGNOSIS — E78.2 HYPERLIPIDEMIA, MIXED: ICD-10-CM

## 2019-03-12 DIAGNOSIS — Z23 NEED FOR STREPTOCOCCUS PNEUMONIAE VACCINATION: ICD-10-CM

## 2019-03-12 DIAGNOSIS — Z13.0 SCREENING FOR DEFICIENCY ANEMIA: ICD-10-CM

## 2019-03-12 DIAGNOSIS — R73.01 IFG (IMPAIRED FASTING GLUCOSE): ICD-10-CM

## 2019-03-12 DIAGNOSIS — Z00.00 ANNUAL PHYSICAL EXAM: Primary | ICD-10-CM

## 2019-03-12 DIAGNOSIS — B00.1 RECURRENT COLD SORES: ICD-10-CM

## 2019-03-12 DIAGNOSIS — Z13.29 THYROID DISORDER SCREEN: ICD-10-CM

## 2019-03-12 DIAGNOSIS — Z12.4 CERVICAL CANCER SCREENING: ICD-10-CM

## 2019-03-12 PROCEDURE — 99999 PR PBB SHADOW E&M-EST. PATIENT-LVL V: CPT | Mod: PBBFAC,,, | Performed by: NURSE PRACTITIONER

## 2019-03-12 PROCEDURE — 90471 PNEUMOCOCCAL POLYSACCHARIDE VACCINE 23-VALENT =>2YO SQ IM: ICD-10-PCS | Mod: S$GLB,,, | Performed by: NURSE PRACTITIONER

## 2019-03-12 PROCEDURE — 99396 PREV VISIT EST AGE 40-64: CPT | Mod: 25,S$GLB,, | Performed by: NURSE PRACTITIONER

## 2019-03-12 PROCEDURE — 99999 PR PBB SHADOW E&M-EST. PATIENT-LVL V: ICD-10-PCS | Mod: PBBFAC,,, | Performed by: NURSE PRACTITIONER

## 2019-03-12 PROCEDURE — 90471 IMMUNIZATION ADMIN: CPT | Mod: S$GLB,,, | Performed by: NURSE PRACTITIONER

## 2019-03-12 PROCEDURE — 90732 PPSV23 VACC 2 YRS+ SUBQ/IM: CPT | Mod: S$GLB,,, | Performed by: NURSE PRACTITIONER

## 2019-03-12 PROCEDURE — 99396 PR PREVENTIVE VISIT,EST,40-64: ICD-10-PCS | Mod: 25,S$GLB,, | Performed by: NURSE PRACTITIONER

## 2019-03-12 PROCEDURE — 90732 PNEUMOCOCCAL POLYSACCHARIDE VACCINE 23-VALENT =>2YO SQ IM: ICD-10-PCS | Mod: S$GLB,,, | Performed by: NURSE PRACTITIONER

## 2019-03-12 RX ORDER — ONDANSETRON 4 MG/1
8 TABLET, FILM COATED ORAL 2 TIMES DAILY PRN
COMMUNITY
Start: 2017-10-19 | End: 2019-03-12 | Stop reason: ALTCHOICE

## 2019-03-12 NOTE — PROGRESS NOTES
Subjective:       Patient ID: Niharika Finney is a 53 y.o. female.    Chief Complaint: Annual Exam    Patient is a 53-year-old white female with a history of gestational diabetes, hyperlipidemia, recurrent cold sores, and a history of Pneumonia in Feb. 2019 that is here today for annual physical exam with fasting lab results.    Patient was diagnosed with Pneumonia by Urgent Care on 2/15/19.  I did a repeat CXR to ensure pneumonia is resolved and lungs are clear.  Will give Pneumonia vaccine today.    Patient has a history of Gestational Diabetes.  She now has an .  Advised on lifestyle modifications.    Patient has history of Hyperlipidemia.  She was put on Atorvastatin and Rosuvastatin in the past but stopped taking medication due to myalgias and has not been on medication for the past year.  Total cholesterol is high at 225 with an .  However patient's liver enzymes are high at this point so we will not address cholesterol until liver enzymes have lowered.    Patient has elevated liver enzymes with an AST of 190 and an ALT of 272.  Patient has not had any recent blood work to evaluate liver functions so unsure if this is a new finding or not.  Patient was recently ill with pneumonia and was taking over-the-counter medicines.  Advised patient to avoid all Tylenol products.  Patient drinks rarely but told to avoid all alcohol as well and we will recheck liver enzymes in 4 weeks.    Patient has recurrent cold sores and takes Valtrex as needed but has not required this medicine in several months.    Wellness labs:  -  patient was scheduled for wellness labs at [facility] but patient had went to [facility] in [facility] which had whole lab orders for CMP and lipid so that was all that was done.  I will get a CBC and TSH for screening with her next blood draw.  -  CMP shows normal kidney function.  Patient does have an impaired fasting glucose of 114 and elevated liver enzymes discussed above  -   cholesterol levels discussed above    Health maintenance:  -  patient refuses colonoscopy but agree to fit kit back in February 2019 visit the patient has not turned in.  Advised patient to turn fit kit in within the next month.  -  mammogram was completed and is normal.  -  patient is aware that she needs to make an appointment for Pap smear        Component      Latest Ref Rng & Units 3/8/2019 4/27/2018 3/6/2013   WBC      4.8 - 10.8 K/uL   5.68   RBC      4.00 - 5.40 M/uL   4.49   Hemoglobin      12.0 - 16.0 gm/dl   13.7   Hematocrit      37.0 - 48.5 %   40.8   MCV      82 - 95 fL   90.9   MCH      27 - 31 pg   30.5   MCHC      32 - 36 %   33.6   RDW      11.5 - 14.5 %   12.6   Gran%      38 - 73 %   52.5   Lymph%      21 - 44 %   38.7   Mono%      0.0 - 7.4 %   7.0   Eosinophil%      0.0 - 4.2 %   1.2   Basophil%      0 - 1.9 %   0.4   Gran # (ANC)      1.8 - 7.7 K/uL   3.0   Lymph #      1 - 4.8 K/uL   2.2   Mono #      0.0 - 0.8 K/uL   0.4   Eos #      0 - 0.45 K/uL   0.1   Baso #      0.0 - 0.2 K/uL   0.0   Platelets      150 - 350 K/uL   183   MPV      9.2 - 12.9 fL   11.0   Glucose      65 - 99 mg/dL 114 (H)  125 (H)   BUN, Bld      7 - 25 mg/dL 17  21 (H)   Creatinine      0.50 - 1.05 mg/dL 0.65  0.7   eGFR if non       > OR = 60 mL/min/1.73m2 102  >60   eGFR if       > OR = 60 mL/min/1.73m2 118  >60   BUN/Creatinine Ratio      6 - 22 (calc) NOT APPLICABLE     Sodium      135 - 146 mmol/L 140  143   Potassium      3.5 - 5.3 mmol/L 4.0  4.5   Chloride      98 - 110 mmol/L 106  108   CO2      20 - 32 mmol/L 26  26   Calcium      8.6 - 10.4 mg/dL 9.1  10.0   Total Protein      6.1 - 8.1 g/dL 6.4  7.1   Albumin      3.6 - 5.1 g/dL 4.0  4.0   Globulin, Total      1.9 - 3.7 g/dL (calc) 2.4     Albumin/Globulin Ratio      1.0 - 2.5 (calc) 1.7     Total Bilirubin      0.2 - 1.2 mg/dL 0.8  0.6   Alkaline Phosphatase      33 - 130 U/L 96  101   AST      10 - 35 U/L 190 (H)  30   ALT       6 - 29 U/L 272 (H)  48 (H)   Anion Gap      8 - 16 mmol/L   9   Cholesterol      <200 mg/dL 225 (H) 245 (A) 259 (H)   Triglycerides      <150 mg/dL 127 150 150   HDL      >50 mg/dL 49 (L) 45 42   LDL Cholesterol      mg/dL (calc) 151 (H) 170 187.0 (H)   HDL/Chol Ratio      <5.0 (calc) 4.6  16.2 (L)   Total Cholesterol/HDL Ratio      2.0 - 5.0   6.2 (H)   Non HDL Chol. (LDL+VLDL)      <130 mg/dL (calc) 176 (H)     Total HDL-C Direct        5.5    TSH      0.4 - 4.0 uIU/ml   0.643   POC Glucose      70 - 110 mg/dL  97        Reading Physician Reading Date Result Priority   MEG López Sr., MD 3/8/2019       Narrative     EXAMINATION:  XR CHEST PA AND LATERAL    CLINICAL HISTORY:  Pneumonia, unspecified organismPneumonia diagnosed at Urgent Care - she will bring in CXR on disc for comparison 2/17/2019;    COMPARISON:  02/17/2019    FINDINGS:  The size and contour of the heart are normal. The lungs are clear. There is no pneumothorax or pleural effusion.  There are mild degenerative changes in the thoracic spine.      Impression       1. The lungs are clear.  2. There are mild degenerative changes in the thoracic spine.  .      Electronically signed by: Cricket López MD       Current Outpatient Medications   Medication Sig Dispense Refill    valACYclovir (VALTREX) 500 MG tablet Take 1 tablet (500 mg total) by mouth 2 (two) times daily. 180 tablet 3     No current facility-administered medications for this visit.        Past Medical History:   Diagnosis Date    ADD (attention deficit disorder with hyperactivity)     Back pain     Herpes labialis     History of gestational diabetes     HLD (hyperlipidemia) 3/13/2013    Impaired fasting glucose 3/13/2013    Recurrent cold sores     Sciatica        Past Surgical History:   Procedure Laterality Date    APPENDECTOMY      HERNIA REPAIR  02/25/16    REPAIR-HERNIA-INCISIONAL ventral ex mesh N/A 1/28/2016    [facility]     TONSILLECTOMY         Family History   Problem Relation Age of Onset    Diabetes Paternal Grandmother     Pancreatic cancer Mother         passed aage 48    Pancreatic cancer Father         passed age 81    Hyperlipidemia Father     Hypertension Father     Gout Father     Cancer Sister         passed away lung cancer age 54    Hyperlipidemia Sister     Hypertension Sister     Heart disease Sister     Coronary artery disease Neg Hx        Social History     Socioeconomic History    Marital status:      Spouse name: None    Number of children: None    Years of education: None    Highest education level: None   Social Needs    Financial resource strain: None    Food insecurity - worry: None    Food insecurity - inability: None    Transportation needs - medical: None    Transportation needs - non-medical: None   Occupational History    None   Tobacco Use    Smoking status: Never Smoker    Smokeless tobacco: Never Used   Substance and Sexual Activity    Alcohol use: No    Drug use: No    Sexual activity: None   Other Topics Concern    None   Social History Narrative    None       Review of Systems   Constitutional: Negative for appetite change, chills, fatigue, fever and unexpected weight change.   HENT: Negative for congestion, ear pain, mouth sores, nosebleeds, postnasal drip, rhinorrhea, sinus pressure, sneezing, sore throat, trouble swallowing and voice change.    Eyes: Negative for photophobia, pain, discharge, redness, itching and visual disturbance.   Respiratory: Negative for cough, chest tightness and shortness of breath.    Cardiovascular: Negative for chest pain, palpitations and leg swelling.   Gastrointestinal: Negative for abdominal pain, blood in stool, constipation, diarrhea, nausea and vomiting.   Genitourinary: Negative for dysuria, frequency, hematuria and urgency.   Musculoskeletal: Negative for arthralgias, back pain, joint swelling and myalgias.   Skin: Negative  "for color change and rash.   Allergic/Immunologic: Negative for immunocompromised state.   Neurological: Negative for dizziness, seizures, syncope, weakness and headaches.   Hematological: Negative for adenopathy. Does not bruise/bleed easily.   Psychiatric/Behavioral: Negative for agitation, dysphoric mood, sleep disturbance and suicidal ideas. The patient is not nervous/anxious.          Objective:     Vitals:    03/12/19 0903 03/12/19 0916   BP: 136/88 134/86   BP Location: Left arm    Pulse: 71    Resp: 18    Temp: 98.1 °F (36.7 °C)    TempSrc: Oral    SpO2: 98%    Weight: 94.3 kg (208 lb)    Height: 5' 3.75" (1.619 m)           Physical Exam   Constitutional: She is oriented to person, place, and time. She appears well-developed and well-nourished.   + obesity with Body mass index is 35.98 kg/m².     HENT:   Head: Normocephalic and atraumatic.   Right Ear: External ear normal.   Left Ear: External ear normal.   Nose: Nose normal.   Mouth/Throat: Oropharynx is clear and moist. No oropharyngeal exudate.   Eyes: EOM are normal. Pupils are equal, round, and reactive to light.   Neck: Normal range of motion. Neck supple. No tracheal deviation present. No thyromegaly present.   Cardiovascular: Normal rate, regular rhythm and normal heart sounds.   No murmur heard.  Pulmonary/Chest: Effort normal and breath sounds normal. No respiratory distress.   Abdominal: Soft. She exhibits no distension.   Musculoskeletal: Normal range of motion. She exhibits no edema.   Lymphadenopathy:     She has no cervical adenopathy.   Neurological: She is alert and oriented to person, place, and time. No cranial nerve deficit. Coordination normal.   Skin: Skin is warm and dry. No rash noted.   Psychiatric: She has a normal mood and affect.         Assessment:         ICD-10-CM ICD-9-CM   1. Annual physical exam Z00.00 V70.0   2. History of pneumonia Z87.01 V12.61   3. Borderline high blood pressure R03.0 796.2   4. Hyperlipidemia, mixed " E78.2 272.2   5. IFG (impaired fasting glucose) R73.01 790.21   6. Elevated LFTs R94.5 790.6   7. Recurrent cold sores B00.1 054.9   8. Screening for deficiency anemia Z13.0 V78.1   9. Thyroid disorder screen Z13.29 V77.0   10. Need for Streptococcus pneumoniae vaccination Z23 V03.82   11. Cervical cancer screening Z12.4 V76.2       Plan:       Annual physical exam  -  turn fit kit in for colon cancer screening within the next 4 weeks.  -  schedule appointment with gyn MD  Health Maintenance Summary     Pap Smear with HPV Cotest Next Due 3/29/2019     Done 3/29/2016     Done 4/6/2015 [employer]: FINDINGS - EXTERNAL LAB DATE - PAP SMEAR    Done 1/6/2015 [employer]: FINDINGS - EXTERNAL LAB DATE - PAP SMEAR   Influenza Vaccine Postponed 4/10/2019 Originally 8/1/2018. Patient Refused    Done 9/25/2017 Imm Admin: Influenza - Quadrivalent    Done 10/12/2015    Fecal Occult Blood Test (FOBT)/FitKit Postponed 5/14/2019 Originally 1965. Patient Scheduled   Mammogram Next Due 3/8/2021     Done 3/8/2019 MAMMO DIGITAL SCREENING BILAT WITH TOMOSYNTHESIS_CAD    Done 9/15/2016 MAMMO DIGITAL SCREENING BILAT WITH CAD    Done 3/15/2013 MAMMO DIGITAL SCREENING BILAT WITH CAD   Lipid Panel Next Due 3/8/2024     Done 3/8/2019 LIPID PANEL Abnormal     Done 4/27/2018 LIPID PANEL Abnormal     Done 2/19/2016     Done 3/6/2013 LIPID PANEL Abnormal     Done 11/19/2009 LIPID PANEL Abnormal     Patient has more history with this topic...   TETANUS VACCINE Next Due 10/7/2025     Done 10/7/2015 Imm Admin: Tdap    Done 11/13/2009 Imm Admin: Tdap   Pneumococcal Vaccine (Medium Risk) This plan is no longer active.     Done 3/12/2019 Imm Admin: Pneumococcal Polysaccharide - 23 Valent         History of pneumonia  -  Pneumonia resolved as verified with CXR  -     Pneumococcal Polysaccharide Vaccine (23 Valent) (SQ/IM)    Borderline high blood pressure  -  Borderline high but was on decongestants recently - will recheck in 4  weeks.    Hyperlipidemia, mixed  -  Will hold off on treatment until liver enzymes are controlled.    IFG (impaired fasting glucose)  - decrease sugars and fats in diet and recheck in 4 weeks.    Elevated LFTs  -  no Tylenol and no alcohol and follow a low-fat diet and will recheck in 4 weeks.  If liver enzymes remain high we will get a liver ultrasound referred to hepatology.  -     Comprehensive metabolic panel; Future; Expected date: 03/27/2019  -     Hepatitis panel, acute; Future; Expected date: 03/27/2019    Recurrent cold sores  -  takes Valtrex p.r.n.    Screening for deficiency anemia  -     CBC auto differential; Future; Expected date: 03/27/2019    Thyroid disorder screen  -     TSH; Future; Expected date: 03/27/2019    Need for Streptococcus pneumoniae vaccination  -     Pneumococcal Polysaccharide Vaccine (23 Valent) (SQ/IM)    Cervical cancer screening  -     Ambulatory Referral to Obstetrics / Gynecology      Follow-up in about 4 weeks (around 4/9/2019) for repeat labs and follow up visit.     Patient's Medications   New Prescriptions    No medications on file   Previous Medications    VALACYCLOVIR (VALTREX) 500 MG TABLET    Take 1 tablet (500 mg total) by mouth 2 (two) times daily.   Modified Medications    No medications on file   Discontinued Medications    AZITHROMYCIN (Z-TADEO) 250 MG TABLET    TABLETS BY MOUTH TAKE 2 TABLETS BY MOUTH ON DAY 1, TAKE 1 TABLET BY MOUTH DAYS 2-5    BROMPHENIRAMINE-PSEUDOEPH-DM (BROMFED DM) 2-30-10 MG/5 ML SYRP    Take 10 mLs by mouth every 4 (four) hours as needed (congestion/cough).    ONDANSETRON (ZOFRAN) 4 MG TABLET    Take 8 mg by mouth 2 (two) times daily as needed.

## 2019-03-12 NOTE — PATIENT INSTRUCTIONS
Low-Fat Diet    A low-fat diet will help you lose weight. It also can lower cholesterol and prevent symptoms of gallbladder disease. The average American diet contains up to 50% fat. This means that 50% of all calories come from fat (about 80 grams to 100 grams of fat per day). Choosing normal portions of foods from the list below can help lower your fat intake. Experts recommend that only 20% to 35% of your daily calories come from fat. The remaining 65% to 80% of calories will come from protein and carbohydrates. This is much healthier for you.  Breads  Ok: Whole-wheat or rye bread, christopher or soda crackers, tonny toast, plain rolls, bagels, English muffins  Avoid: Rolls and breads containing whole milk or egg; waffles, pancakes, biscuits, corn bread; cheese crackers, other flavored crackers, pastries, doughnuts  Cereals  Ok: Oatmeal, whole-wheat, bran, multigrain, rice  Avoid: Granola or other cereals that have oil, coconut, or more than 2 grams of fat per serving  Cheese and eggs  Ok: Cheeses labeled low-fat; 3 whole eggs per week; egg whites and egg substitutes as desired  Avoid: All other cheeses  Desserts  Ok: Gelatin, slushy, felicia food cake, meringues, nonfat yogurt, and puddings or sherbet made with nonfat milk  Avoid: Any other store-bought desserts, or desserts that have fat, whole milk, cream, chocolate, and coconut  Drinks  Ok: Nonfat milk, coffee, tea, fizzy (carbonated) drinks  Avoid: Whole and reduced-fat milk, evaporated and condensed milk, hot chocolate mixes, milk shakes, malts, eggnog  Fats  Ok: You may have up to 3 teaspoons of fat daily. This can be butter, margarine, mayonnaise, or healthy oils (canola or olive)  Avoid: Cream, nondairy creams, cream cheese, gravies, and cream sauces  Fruits  Ok: All fruits made without fat  Avoid: Coconut, olives  Meats, poultry, fish  Ok: Limit meat to 6 ounces daily (broiled, roasted, baked, grilled, or boiled). Buy lean cuts, and trim off the fat. Try  beef, fish, lamb, pork, and canned fish packed in water; also chicken and turkey with the skin removed.  Avoid: Fried meats, fish, or poultry; fried eggs, and fish canned in oils; fatty meats such as cook, sausage, corned beef, hot dogs, and lunch meats; meats with gravies and sauces  Potatoes, beans, pasta  Ok: Dried beans, split peas, lentils, potatoes, rice, pasta made without added fat  Avoid: French fries, potato chips, potatoes prepared with butter, refried beans  Soups  Ok: Clear broth soups without fat and with allowed vegetables  Avoid: Cream-based soups  Vegetables  Ok: Fresh, frozen, canned or dried vegetables, all made without added fat  Avoid: Fried vegetables and those prepared with butter, cream, sauces  Miscellaneous  Ok: Salt, sugar, jelly, hard candy, marshmallows, honey, syrup, spices and herbs, mustard, ketchup, lemon, and vinegar. Try to limit sweets and added sugars.  Avoid: Chocolate, nuts, coconut, and cream candies; sunflower, sesame, and other seeds; fried foods; cream sauces and gravies; pizza  Date Last Reviewed: 8/1/2016 © 2000-2017 [employer]. [address] [place] [address]. All rights reserved. This information is not intended as a substitute for professional medical care. Always follow your healthcare professional's instructions.      Controlling Your Cholesterol  Cholesterol is a waxy substance. It travels in your blood through the blood vessels. When you have high cholesterol, it builds up in the walls of the blood vessels. This makes the vessels narrower. Blood flow decreases. You are then at greater risk for having a heart attack or a stroke.  Good and bad cholesterol  Lipids are fats. Blood is mostly water. Fat and water don't mix. So our bodies need lipoproteins (lipids inside a protein shell) to carry the lipids. The protein shell carries its lipids through the bloodstream. There are two main kinds of lipoproteins:  · LDL (low-density lipoprotein) is  "known as "bad cholesterol." It mainly carries cholesterol. It delivers this cholesterol to body cells. Excess LDL cholesterol will build up in artery walls. This increases your risk for heart disease and stroke.  · HDL (high-density lipoprotein) is known as "good cholesterol." This protein shell collects excess cholesterol that LDLs have left behind on blood vessel walls. That's why high levels of HDL cholesterol can decrease your risk of heart disease and stroke.  Controlling cholesterol levels  Total cholesterol includes LDL and HDL cholesterol, as well as other fats in the bloodstream. If your total cholesterol is high, follow the steps below to help lower your total cholesterol level:  · Eat less unhealthy fat:  ¨ Cut back on saturated fats and trans (also called hydrogenated) fats by selecting lean cuts of meat, low-fat dairy, and using oils instead of solid fats. Limit baked goods, processed meats, and fried foods. A diet thats high in these fats increases your bad cholesterol. It's not enough to just cut back on foods containing cholesterol.  ¨ Eat about 2 servings of fish per week. Most fish contain omega-3 fatty acids. These help lower blood cholesterol.  ¨ Eat more whole grains and soluble fiber (such as oat bran). These lower overall cholesterol.  · Be active:  ¨ Choose an activity you enjoy. Walking, swimming, and riding a bike are some good ways to be active.  ¨ Start at a level where you feel comfortable. Increase your time and pace a little each week.  ¨ Work up to 40 minutes of moderate to high intensity physical activity at least 3 to 4 days per week.  ¨ Remember, some activity is better than none.  ¨ If you haven't been exercising regularly, start slowly. Check with your doctor to make sure the exercise plan is right for you.  · Quit smoking. Quitting smoking can improve your lipid levels. It also lowers your risk for heart disease and stroke.  · Weight management. If you are overweight or obese, " your health care provider will work with you to lose weight and lower your BMI (body mass index) to a normal or near-normal level. Making diet changes and increasing physical activity can help.  · Take medication as directed. Many people need medication to get their LDL levels to a safe level. Medication to lower cholesterol levels is effective and safe. (But taking medication is not a substitute for exercise or watching your diet!) Your doctor can tell you whether you might benefit from a cholesterol-lowering medication.  Date Last Reviewed: 5/11/2015 © 2000-2017 [employer]. [address] [place] [address]. All rights reserved. This information is not intended as a substitute for professional medical care. Always follow your healthcare professional's instructions.          Low-Cholesterol Diet  Your body needs cholesterol to build new cells and create certain hormones. There are 2 kinds of cholesterol in your blood:     · HDL (good) cholesterol. This prevents fat deposits (plaque) from building up in your arteries. In this way it protects against heart disease and stroke.  · LDL (bad) cholesterol. This stays in your body and sticks to artery walls. Over time it may block blood flow to the heart and brain. This can cause a heart attack or stroke.  The cholesterol in your blood comes from 2 sources: cholesterol in food that you eat and cholesterol that your liver makes. You should limit the amount of cholesterol in your diet. But the cholesterol that your body makes has the greatest disease risk. And your body makes more cholesterol when your diet is high in bad fats (saturated and trans fats). There are 2 kinds of fats you can eat:  · Good fats, or unsaturated fats (mono-unsaturated and poly-unsaturated). They raise the level of good cholesterol and lower the level of bad cholesterol. Good fats are found in vegetable oils such as olive, sunflower, corn, and soybean oils, and in nuts and  seeds.  · Bad fats, or saturated fats (including foods high in cholesterol) and trans fats. These raise your risk of disease. They lower the good cholesterol and raise the level of bad cholesterol. Bad fats are found in animal products, including meat, whole-milk dairy products, and butter. Some plants are also high in bad fats (coconut and palm plants). Trans fats are found in hard (stick) margarines. They are also in many fast foods and commercially baked goods. Soft margarine sold in tubs has fewer trans fats and is safer to use.  High blood cholesterol is usually due to a diet high in saturated fat, along with not being physically active. In some cases, genetics plays a role in causing high cholesterol. The tips below will help you create healthy eating habits that will help lower your blood cholesterol level.  Create a diet high in good fats, low in bad fats (and low in cholesterol)  The following steps will help you create a diet high in good fats and low in bad fats:  · Talk with your doctor before starting a low cholesterol diet or weight loss program.  · Learn to read nutrition labels and select appropriate portion sizes.  · When cooking, use plant-based unsaturated vegetable oils (sunflower, corn, soybean, canola, peanut, and olive oils).  · Avoid saturated fats found in animal products such as meat, dairy (whole-milk, cheese and ice cream), poultry skin, and egg yolks. Plants high in saturated oils include coconut oil, palm oil, and palm kernel oil.  · If you eat meat, choose smaller portions and lean cuts, such as round, heidi, sirloin, or loin. Eat more meatless meals.  · Replace meat with fish at least 2 times a week. Fish is an important source of the unsaturated fat called omega-3 fatty acids. This fat has potential to lower the risk of heart disease.  · Replace whole-milk dairy products with low-fat or nonfat products. Try soy products. Soy helps to reduce total cholesterol.  · Supplement your diet  with protective fibers. Eat nuts, seeds, and whole grains rather than white rice and bread. These foods lower both cholesterol and triglyceride levels. (Triglycerides are another fat found in the blood.) Walnuts are one of the best sources of omega-3 fatty acids.  · Eat plenty of fresh fruits and vegetables daily.  · Avoid fast foods and commercial baked goods. Assume they contain saturated fat unless labeled otherwise.  Date Last Reviewed: 8/1/2016 © 2000-2017 [employer]. [address] [place] [address], [place] [address]. All rights reserved. This information is not intended as a substitute for professional medical care. Always follow your healthcare professional's instructions.

## 2019-03-19 PROCEDURE — 82274 ASSAY TEST FOR BLOOD FECAL: CPT

## 2019-03-21 ENCOUNTER — LAB VISIT (OUTPATIENT)
Dept: LAB | Facility: HOSPITAL | Age: 54
End: 2019-03-21
Attending: NURSE PRACTITIONER
Payer: COMMERCIAL

## 2019-03-21 DIAGNOSIS — Z12.11 COLON CANCER SCREENING: ICD-10-CM

## 2019-03-22 LAB — HEMOCCULT STL QL IA: NEGATIVE

## 2019-04-04 ENCOUNTER — OFFICE VISIT (OUTPATIENT)
Dept: OBSTETRICS AND GYNECOLOGY | Facility: CLINIC | Age: 54
End: 2019-04-04
Payer: COMMERCIAL

## 2019-04-04 VITALS
DIASTOLIC BLOOD PRESSURE: 72 MMHG | WEIGHT: 203.06 LBS | SYSTOLIC BLOOD PRESSURE: 124 MMHG | BODY MASS INDEX: 35.13 KG/M2

## 2019-04-04 DIAGNOSIS — Z01.419 WELL WOMAN EXAM WITH ROUTINE GYNECOLOGICAL EXAM: Primary | ICD-10-CM

## 2019-04-04 DIAGNOSIS — Z12.4 SCREENING FOR CERVICAL CANCER: ICD-10-CM

## 2019-04-04 PROCEDURE — 99999 PR PBB SHADOW E&M-EST. PATIENT-LVL II: ICD-10-PCS | Mod: PBBFAC,,, | Performed by: OBSTETRICS & GYNECOLOGY

## 2019-04-04 PROCEDURE — 87624 HPV HI-RISK TYP POOLED RSLT: CPT

## 2019-04-04 PROCEDURE — 88175 CYTOPATH C/V AUTO FLUID REDO: CPT

## 2019-04-04 PROCEDURE — 99999 PR PBB SHADOW E&M-EST. PATIENT-LVL II: CPT | Mod: PBBFAC,,, | Performed by: OBSTETRICS & GYNECOLOGY

## 2019-04-04 PROCEDURE — 99386 PR PREVENTIVE VISIT,NEW,40-64: ICD-10-PCS | Mod: S$GLB,,, | Performed by: OBSTETRICS & GYNECOLOGY

## 2019-04-04 PROCEDURE — 99386 PREV VISIT NEW AGE 40-64: CPT | Mod: S$GLB,,, | Performed by: OBSTETRICS & GYNECOLOGY

## 2019-04-04 NOTE — LETTER
April 4, 2019      [place]  [address] [facility]  [address]  [facility] [address]           [place]  [address]  [place] [address]  Phone: [phone]          Patient: Niharika Finney   MR Number: [id number]   YOB: 1965   Date of Visit: 4/4/2019       Dear Zoraida Helms:    Thank you for referring Niharika Finney to me for evaluation. Attached you will find relevant portions of my assessment and plan of care.    If you have questions, please do not hesitate to call me. I look forward to following Niharika Finney along with you.    Sincerely,    Kasey Smith MD    Enclosure  CC:  No Recipients    If you would like to receive this communication electronically, please contact [email or web address] or [phone] to request more information on [facility] access.    For providers and/or their staff who would like to refer a patient to [facility], please contact us through our one-stop-shop provider referral line, Imer Humphrey, at [phone].    If you feel you have received this communication in error or would no longer like to receive these types of communications, please e-mail [email or web address]

## 2019-04-04 NOTE — PROGRESS NOTES
GYNECOLOGY OFFICE NOTE    Reason for visit: annual    HPI: Pt is a 53 y.o.  female  who presents for annual. Cycle: menarche- 16, menopausal since age 46. Denies postmenopausal bleeding. She is not sexually active. She does not desire STI screening. She denies vaginal discharge.  Last pap: >3yr, denies hx of abnormal. Last MMG 3/2019- negative.     Past Medical History:   Diagnosis Date    ADD (attention deficit disorder with hyperactivity)     Back pain     Herpes labialis     History of gestational diabetes     HLD (hyperlipidemia) 3/13/2013    Impaired fasting glucose 3/13/2013    Recurrent cold sores     Sciatica        Past Surgical History:   Procedure Laterality Date    APPENDECTOMY      HERNIA REPAIR  16    REPAIR-HERNIA-INCISIONAL ventral ex mesh N/A 2016    [facility]    TONSILLECTOMY         Family History   Problem Relation Age of Onset    Diabetes Paternal Grandmother     Pancreatic cancer Mother         passed aage 48    Pancreatic cancer Father         passed age 81    Hyperlipidemia Father     Hypertension Father     Gout Father     Cancer Sister         passed away lung cancer age 54    Hyperlipidemia Sister     Hypertension Sister     Heart disease Sister     Coronary artery disease Neg Hx        Social History     Tobacco Use    Smoking status: Never Smoker    Smokeless tobacco: Never Used   Substance Use Topics    Alcohol use: No    Drug use: No       OB History    Para Term  AB Living   4 3 2 1 1 3   SAB TAB Ectopic Multiple Live Births   1       3      # Outcome Date GA Lbr Marc/2nd Weight Sex Delivery Anes PTL Lv   4 Term     F Vag-Spont   JULY   3   37w0d   F Vag-Spont   JULY   2 Term     M Vag-Spont   JULY   1 SAB  12w0d              Current Outpatient Medications   Medication Sig    valACYclovir (VALTREX) 500 MG tablet Take 1 tablet (500 mg total) by mouth 2 (two) times  daily.     No current facility-administered medications for this visit.        Allergies: Patient has no known allergies.     /72   Wt 92.1 kg (203 lb 0.7 oz)   LMP  (LMP Unknown)   BMI 35.13 kg/m²     ROS:  GENERAL: Denies fever or chills.   SKIN: Denies rash or lesions.   HEAD: Denies head injury or headache.   CHEST: Denies chest pain or shortness of breath.   CARDIOVASCULAR: Denies palpitations or chest pain.   ABDOMEN: No abdominal pain, constipation, diarrhea, nausea, vomiting or rectal bleeding.   URINARY: No dysuria, hematuria, or burning on urination.  REPRODUCTIVE: See HPI.   BREASTS: see HPI  NEUROLOGIC: Denies syncope or weakness.     Physical Exam:  GENERAL: alert, appears stated age and cooperative  NEUROLOGIC: orientated to person, place and time, normal mood and affect   CHEST: Normal respiratory effort  NECK: normal appearance, no thyromegaly masses or tenderness  SKIN: no acne, striae, hirsutism  BREAST EXAM: breasts appear normal, no suspicious masses, no skin or nipple changes or axillary nodes  ABDOMEN: abdomen is soft without significant tenderness, masses, organomegaly or guarding  EXTERNAL GENITALIA:  normal general appearance  URETHRA: normal urethra, normal urethral meatus  VAGINA:  normal mucosa without prolapse or lesions  CERVIX:  Normal  UTERUS:  mobile, non-tender  ADNEXA:  normal adnexa in size, nontender and no masses    Diagnosis:  1. Well woman exam with routine gynecological exam    2. Screening for cervical cancer        Plan:   1. Annual  2. Pap/hpv today    Orders Placed This Encounter    HPV High Risk Genotypes, PCR    Liquid-based pap smear, screening       Patient was counseled today on the new ACS guidelines for cervical cytology screening as well as the current recommendations for breast cancer screening. She was counseled to follow up with her PCP for other routine health maintenance.     Follow up in about 1 year (around 4/4/2020) for annual.      Kasey PROCTOR  MD [facility]  OB/GYN  Pager: [phone]

## 2019-04-05 LAB
ALBUMIN SERPL-MCNC: 4.1 G/DL (ref 3.6–5.1)
ALBUMIN/GLOB SERPL: 1.9 (CALC) (ref 1–2.5)
ALP SERPL-CCNC: 83 U/L (ref 33–130)
ALT SERPL-CCNC: 140 U/L (ref 6–29)
AST SERPL-CCNC: 87 U/L (ref 10–35)
BASOPHILS # BLD AUTO: 30 CELLS/UL (ref 0–200)
BASOPHILS NFR BLD AUTO: 0.4 %
BILIRUB SERPL-MCNC: 0.8 MG/DL (ref 0.2–1.2)
BUN SERPL-MCNC: 20 MG/DL (ref 7–25)
BUN/CREAT SERPL: ABNORMAL (CALC) (ref 6–22)
CALCIUM SERPL-MCNC: 9.3 MG/DL (ref 8.6–10.4)
CHLORIDE SERPL-SCNC: 106 MMOL/L (ref 98–110)
CO2 SERPL-SCNC: 27 MMOL/L (ref 20–32)
CREAT SERPL-MCNC: 0.65 MG/DL (ref 0.5–1.05)
EOSINOPHIL # BLD AUTO: 52 CELLS/UL (ref 15–500)
EOSINOPHIL NFR BLD AUTO: 0.7 %
ERYTHROCYTE [DISTWIDTH] IN BLOOD BY AUTOMATED COUNT: 12.9 % (ref 11–15)
GFRSERPLBLD MDRD-ARVRAT: 102 ML/MIN/1.73M2
GLOBULIN SER CALC-MCNC: 2.2 G/DL (CALC) (ref 1.9–3.7)
GLUCOSE SERPL-MCNC: 87 MG/DL (ref 65–99)
HAV IGM SERPL QL IA: NORMAL
HBV CORE IGM SERPL QL IA: NORMAL
HBV SURFACE AG SERPL QL IA: NORMAL
HCT VFR BLD AUTO: 44.8 % (ref 35–45)
HCV AB S/CO SERPL IA: 0.02
HCV AB SERPL QL IA: NORMAL
HGB BLD-MCNC: 15.2 G/DL (ref 11.7–15.5)
LYMPHOCYTES # BLD AUTO: 2671 CELLS/UL (ref 850–3900)
LYMPHOCYTES NFR BLD AUTO: 36.1 %
MCH RBC QN AUTO: 31.2 PG (ref 27–33)
MCHC RBC AUTO-ENTMCNC: 33.9 G/DL (ref 32–36)
MCV RBC AUTO: 92 FL (ref 80–100)
MONOCYTES # BLD AUTO: 607 CELLS/UL (ref 200–950)
MONOCYTES NFR BLD AUTO: 8.2 %
NEUTROPHILS # BLD AUTO: 4040 CELLS/UL (ref 1500–7800)
NEUTROPHILS NFR BLD AUTO: 54.6 %
PLATELET # BLD AUTO: 171 THOUSAND/UL (ref 140–400)
PMV BLD REES-ECKER: 11.7 FL (ref 7.5–12.5)
POTASSIUM SERPL-SCNC: 4.2 MMOL/L (ref 3.5–5.3)
PROT SERPL-MCNC: 6.3 G/DL (ref 6.1–8.1)
RBC # BLD AUTO: 4.87 MILLION/UL (ref 3.8–5.1)
SODIUM SERPL-SCNC: 140 MMOL/L (ref 135–146)
TSH SERPL-ACNC: 2.79 MIU/L
WBC # BLD AUTO: 7.4 THOUSAND/UL (ref 3.8–10.8)

## 2019-04-09 LAB
HPV HR 12 DNA CVX QL NAA+PROBE: NEGATIVE
HPV16 AG SPEC QL: NEGATIVE
HPV18 DNA SPEC QL NAA+PROBE: NEGATIVE

## 2019-04-11 ENCOUNTER — OFFICE VISIT (OUTPATIENT)
Dept: FAMILY MEDICINE | Facility: CLINIC | Age: 54
End: 2019-04-11
Payer: COMMERCIAL

## 2019-04-11 VITALS
BODY MASS INDEX: 34.31 KG/M2 | TEMPERATURE: 98 F | WEIGHT: 201 LBS | SYSTOLIC BLOOD PRESSURE: 128 MMHG | DIASTOLIC BLOOD PRESSURE: 80 MMHG | RESPIRATION RATE: 18 BRPM | HEIGHT: 64 IN | HEART RATE: 78 BPM | OXYGEN SATURATION: 96 %

## 2019-04-11 DIAGNOSIS — R79.89 ELEVATED LFTS: Primary | ICD-10-CM

## 2019-04-11 DIAGNOSIS — E78.2 HYPERLIPIDEMIA, MIXED: ICD-10-CM

## 2019-04-11 DIAGNOSIS — B00.1 RECURRENT COLD SORES: ICD-10-CM

## 2019-04-11 PROCEDURE — 99214 OFFICE O/P EST MOD 30 MIN: CPT | Mod: S$GLB,,, | Performed by: NURSE PRACTITIONER

## 2019-04-11 PROCEDURE — 3008F PR BODY MASS INDEX (BMI) DOCUMENTED: ICD-10-PCS | Mod: CPTII,S$GLB,, | Performed by: NURSE PRACTITIONER

## 2019-04-11 PROCEDURE — 99999 PR PBB SHADOW E&M-EST. PATIENT-LVL IV: ICD-10-PCS | Mod: PBBFAC,,, | Performed by: NURSE PRACTITIONER

## 2019-04-11 PROCEDURE — 99999 PR PBB SHADOW E&M-EST. PATIENT-LVL IV: CPT | Mod: PBBFAC,,, | Performed by: NURSE PRACTITIONER

## 2019-04-11 PROCEDURE — 3008F BODY MASS INDEX DOCD: CPT | Mod: CPTII,S$GLB,, | Performed by: NURSE PRACTITIONER

## 2019-04-11 PROCEDURE — 99214 PR OFFICE/OUTPT VISIT, EST, LEVL IV, 30-39 MIN: ICD-10-PCS | Mod: S$GLB,,, | Performed by: NURSE PRACTITIONER

## 2019-04-11 NOTE — PROGRESS NOTES
Subjective:       Patient ID: Niharika Finney is a 53 y.o. female.    Chief Complaint: Follow-up (lab results)    Patient is a 53-year-old white female with a history of gestational diabetes, hyperlipidemia, recurrent cold sores, Elevated Liver Enzymes and a history of Pneumonia in Feb. 2019 that is here today for follow up with fasting lab results.     Patient has a history of Gestational Diabetes.  She had an  in March 2019.  With lifestyle modifications, FBG is now 87.       Patient has history of Hyperlipidemia.  She was put on Atorvastatin and Rosuvastatin in the past but stopped taking medication due to myalgias and has not been on medication for the past year.  Total cholesterol is high at 225 with an  when checked in March 2019.  However patient's liver enzymes are high at this point so we will not address cholesterol until liver enzymes have lowered.     Patient had elevated liver enzymes with an AST of 190 and an ALT of 272 with wellness labs in March 2019 .  Patient has not had any recent blood work to evaluate liver functions so unsure if this was a new finding or not.  Patient was recently ill with pneumonia and was taking over-the-counter medicines at end of Feb. 2019.  Advised patient to avoid all Tylenol products.  Patient drinks rarely but told to avoid all alcohol as well and patient has been following a low-fat diet for the past month.  Liver enzymes are improved but still HIGH with AST 87 and .  Negative Hepatitis panel and not on any supplements.  CT in 2016 showed a normal liver - will refer to Hepatology for full liver workup to rule out autoimmune disorders.     Patient has recurrent cold sores and takes Valtrex as needed but has not required this medicine in several months.       Component      Latest Ref Rng & Units 3/19/2019   Fecal Immunochemical Test (iFOBT)      Negative Negative     Component      Latest Ref Rng & Units 4/4/2019 3/8/2019   WBC      3.8 - 10.8  Thousand/uL 7.4    RBC      3.80 - 5.10 Million/uL 4.87    Hemoglobin      11.7 - 15.5 g/dL 15.2    Hematocrit      35.0 - 45.0 % 44.8    MCV      80.0 - 100.0 fL 92.0    MCH      27.0 - 33.0 pg 31.2    MCHC      32.0 - 36.0 g/dL 33.9    RDW      11.0 - 15.0 % 12.9    Platelets      140 - 400 Thousand/uL 171    MPV      7.5 - 12.5 fL 11.7    Neutrophils Absolute      1,500 - 7,800 cells/uL 4,040    Lymph #      850 - 3,900 cells/uL 2,671    Mono #      200 - 950 cells/uL 607    Eos #      15 - 500 cells/uL 52    Baso #      0 - 200 cells/uL 30    Neutrophils Relative      % 54.6    Lymph%      % 36.1    Mono%      % 8.2    Eosinophil%      % 0.7    Basophil%      % 0.4    Glucose      65 - 99 mg/dL 87 114 (H)   BUN, Bld      7 - 25 mg/dL 20 17   Creatinine      0.50 - 1.05 mg/dL 0.65 0.65   eGFR if non       > OR = 60 mL/min/1.73m2 102 102   eGFR if       > OR = 60 mL/min/1.73m2 118 118   BUN/Creatinine Ratio      6 - 22 (calc) NOT APPLICABLE NOT APPLICABLE   Sodium      135 - 146 mmol/L 140 140   Potassium      3.5 - 5.3 mmol/L 4.2 4.0   Chloride      98 - 110 mmol/L 106 106   CO2      20 - 32 mmol/L 27 26   Calcium      8.6 - 10.4 mg/dL 9.3 9.1   Total Protein      6.1 - 8.1 g/dL 6.3 6.4   Albumin      3.6 - 5.1 g/dL 4.1 4.0   Globulin, Total      1.9 - 3.7 g/dL (calc) 2.2 2.4   Albumin/Globulin Ratio      1.0 - 2.5 (calc) 1.9 1.7   Total Bilirubin      0.2 - 1.2 mg/dL 0.8 0.8   Alkaline Phosphatase      33 - 130 U/L 83 96   AST      10 - 35 U/L 87 (H) 190 (H)   ALT      6 - 29 U/L 140 (H) 272 (H)   Cholesterol      <200 mg/dL  225 (H)   HDL      >50 mg/dL  49 (L)   Triglycerides      <150 mg/dL  127   LDL Cholesterol      mg/dL (calc)  151 (H)   HDL/Chol Ratio      <5.0 (calc)  4.6   Non HDL Chol. (LDL+VLDL)      <130 mg/dL (calc)  176 (H)   Hep A IgM      NON-REACTIVE NON-REACTIVE    Hepatitis B Surface Ag      NON-REACTIVE NON-REACTIVE    Hep B C IgM      NON-REACTIVE  NON-REACTIVE    Hepatitis C Ab      NON-REACTIVE NON-REACTIVE    Signal/Cutoff      <1.00 0.02    TSH      mIU/L 2.79        Current Outpatient Medications   Medication Sig Dispense Refill    valACYclovir (VALTREX) 500 MG tablet Take 1 tablet (500 mg total) by mouth 2 (two) times daily. (Patient taking differently: Take 500 mg by mouth 2 (two) times daily as needed (cold sore outbreak). ) 180 tablet 3     No current facility-administered medications for this visit.        Past Medical History:   Diagnosis Date    ADD (attention deficit disorder with hyperactivity)     Back pain     Herpes labialis     History of gestational diabetes     HLD (hyperlipidemia) 3/13/2013    Impaired fasting glucose 3/13/2013    Recurrent cold sores     Sciatica        Past Surgical History:   Procedure Laterality Date    APPENDECTOMY      HERNIA REPAIR  02/25/16    REPAIR-HERNIA-INCISIONAL ventral ex mesh N/A 1/28/2016    [facility]    TONSILLECTOMY         Family History   Problem Relation Age of Onset    Diabetes Paternal Grandmother     Pancreatic cancer Mother         passed aage 48    Pancreatic cancer Father         passed age 81    Hyperlipidemia Father     Hypertension Father     Gout Father     Cancer Sister         passed away lung cancer age 54    Hyperlipidemia Sister     Hypertension Sister     Heart disease Sister     Coronary artery disease Neg Hx        Social History     Socioeconomic History    Marital status:      Spouse name: Not on file    Number of children: Not on file    Years of education: Not on file    Highest education level: Not on file   Occupational History    Not on file   Social Needs    Financial resource strain: Not on file    Food insecurity:     Worry: Not on file     Inability: Not on file    Transportation needs:     Medical: Not on file     Non-medical: Not on file   Tobacco Use    Smoking status: Never Smoker    Smokeless  tobacco: Never Used   Substance and Sexual Activity    Alcohol use: No    Drug use: No    Sexual activity: Not Currently     Birth control/protection: Post-menopausal   Lifestyle    Physical activity:     Days per week: Not on file     Minutes per session: Not on file    Stress: Not on file   Relationships    Social connections:     Talks on phone: Not on file     Gets together: Not on file     Attends Islam service: Not on file     Active member of club or organization: Not on file     Attends meetings of clubs or organizations: Not on file     Relationship status: Not on file   Other Topics Concern    Not on file   Social History Narrative    Not on file       Review of Systems   Constitutional: Negative for appetite change, chills, fatigue, fever and unexpected weight change.   HENT: Negative for congestion, ear pain, mouth sores, nosebleeds, postnasal drip, rhinorrhea, sinus pressure, sneezing, sore throat, trouble swallowing and voice change.    Eyes: Negative for photophobia, pain, discharge, redness, itching and visual disturbance.   Respiratory: Negative for cough, chest tightness and shortness of breath.    Cardiovascular: Negative for chest pain, palpitations and leg swelling.   Gastrointestinal: Negative for abdominal pain, blood in stool, constipation, diarrhea, nausea and vomiting.   Genitourinary: Negative for dysuria, frequency, hematuria and urgency.   Musculoskeletal: Negative for arthralgias, back pain, joint swelling and myalgias.   Skin: Negative for color change and rash.   Allergic/Immunologic: Negative for immunocompromised state.   Neurological: Negative for dizziness, seizures, syncope, weakness and headaches.   Hematological: Negative for adenopathy. Does not bruise/bleed easily.   Psychiatric/Behavioral: Negative for agitation, dysphoric mood, sleep disturbance and suicidal ideas. The patient is not nervous/anxious.          Objective:     Vitals:    04/11/19 0917   BP: 132/88  "  Pulse: 78   Resp: 18   Temp: 98 °F (36.7 °C)   TempSrc: Oral   SpO2: 96%   Weight: 91.2 kg (201 lb)   Height: 5' 3.75" (1.619 m)          Physical Exam   Constitutional: She is oriented to person, place, and time. She appears well-developed and well-nourished.   + obesity with Body mass index is 34.77 kg/m².   HENT:   Head: Normocephalic and atraumatic.   Right Ear: External ear normal.   Left Ear: External ear normal.   Nose: Nose normal.   Mouth/Throat: Oropharynx is clear and moist. No oropharyngeal exudate.   Eyes: Pupils are equal, round, and reactive to light. EOM are normal.   Neck: Normal range of motion. Neck supple. No tracheal deviation present. No thyromegaly present.   Cardiovascular: Normal rate, regular rhythm and normal heart sounds.   No murmur heard.  Pulmonary/Chest: Effort normal and breath sounds normal. No respiratory distress.   Abdominal: Soft. She exhibits no distension.   Musculoskeletal: Normal range of motion. She exhibits no edema.   Lymphadenopathy:     She has no cervical adenopathy.   Neurological: She is alert and oriented to person, place, and time. No cranial nerve deficit. Coordination normal.   Skin: Skin is warm and dry. No rash noted.   Psychiatric: She has a normal mood and affect.         Assessment:         ICD-10-CM ICD-9-CM   1. Elevated LFTs R94.5 790.6   2. Hyperlipidemia, mixed E78.2 272.2   3. Recurrent cold sores B00.1 054.9       Plan:       Elevated LFTs  -  Referral to hepatology for liver workup to rule out autoimmune or other liver disorders.  -     Ambulatory Referral to Hepatology    Hyperlipidemia, mixed  -  Will hold off on treatment of Cholesterol with medication until evaluation of liver.  In meantime, continue low-fat diet.    Recurrent cold sores  -  Valtrex prn      Follow up for follow up pending Hepatology workup.     Patient's Medications   New Prescriptions    No medications on file   Previous Medications    VALACYCLOVIR (VALTREX) 500 MG TABLET    " Take 1 tablet (500 mg total) by mouth 2 (two) times daily.   Modified Medications    No medications on file   Discontinued Medications    No medications on file

## 2019-04-12 ENCOUNTER — TELEPHONE (OUTPATIENT)
Dept: OBSTETRICS AND GYNECOLOGY | Facility: HOSPITAL | Age: 54
End: 2019-04-12

## 2019-04-12 NOTE — TELEPHONE ENCOUNTER
Called to inform of negative pap/hpv no answer. Message also sent in charli Smith MD, FACOG  OB/GYN  Pager: [phone]

## 2019-05-03 ENCOUNTER — OFFICE VISIT (OUTPATIENT)
Dept: FAMILY MEDICINE | Facility: CLINIC | Age: 54
End: 2019-05-03
Payer: COMMERCIAL

## 2019-05-03 VITALS
TEMPERATURE: 99 F | HEIGHT: 64 IN | BODY MASS INDEX: 34.15 KG/M2 | SYSTOLIC BLOOD PRESSURE: 120 MMHG | OXYGEN SATURATION: 98 % | HEART RATE: 68 BPM | DIASTOLIC BLOOD PRESSURE: 78 MMHG | RESPIRATION RATE: 18 BRPM | WEIGHT: 200 LBS

## 2019-05-03 DIAGNOSIS — M54.50 ACUTE BILATERAL LOW BACK PAIN WITHOUT SCIATICA: ICD-10-CM

## 2019-05-03 DIAGNOSIS — R09.82 ALLERGIC RHINITIS WITH POSTNASAL DRIP: ICD-10-CM

## 2019-05-03 DIAGNOSIS — R42 DIZZINESS: ICD-10-CM

## 2019-05-03 DIAGNOSIS — H81.12 BENIGN PAROXYSMAL POSITIONAL VERTIGO OF LEFT EAR: Primary | ICD-10-CM

## 2019-05-03 DIAGNOSIS — J30.9 ALLERGIC RHINITIS WITH POSTNASAL DRIP: ICD-10-CM

## 2019-05-03 LAB
BILIRUB SERPL-MCNC: ABNORMAL MG/DL
BLOOD URINE, POC: ABNORMAL
COLOR, POC UA: ABNORMAL
GLUCOSE UR QL STRIP: NORMAL
KETONES UR QL STRIP: ABNORMAL
LEUKOCYTE ESTERASE URINE, POC: ABNORMAL
NITRITE, POC UA: ABNORMAL
PH, POC UA: 5
PROTEIN, POC: ABNORMAL
SPECIFIC GRAVITY, POC UA: 1.03
UROBILINOGEN, POC UA: NORMAL

## 2019-05-03 PROCEDURE — 3008F BODY MASS INDEX DOCD: CPT | Mod: CPTII,S$GLB,, | Performed by: NURSE PRACTITIONER

## 2019-05-03 PROCEDURE — 99999 PR PBB SHADOW E&M-EST. PATIENT-LVL IV: CPT | Mod: PBBFAC,,, | Performed by: NURSE PRACTITIONER

## 2019-05-03 PROCEDURE — 99999 PR PBB SHADOW E&M-EST. PATIENT-LVL IV: ICD-10-PCS | Mod: PBBFAC,,, | Performed by: NURSE PRACTITIONER

## 2019-05-03 PROCEDURE — 81001 POCT URINALYSIS, DIPSTICK OR TABLET REAGENT, AUTOMATED, WITH MICROSCOP: ICD-10-PCS | Mod: S$GLB,,, | Performed by: NURSE PRACTITIONER

## 2019-05-03 PROCEDURE — 99214 OFFICE O/P EST MOD 30 MIN: CPT | Mod: 25,S$GLB,, | Performed by: NURSE PRACTITIONER

## 2019-05-03 PROCEDURE — 81001 URINALYSIS AUTO W/SCOPE: CPT | Mod: S$GLB,,, | Performed by: NURSE PRACTITIONER

## 2019-05-03 PROCEDURE — 99214 PR OFFICE/OUTPT VISIT, EST, LEVL IV, 30-39 MIN: ICD-10-PCS | Mod: 25,S$GLB,, | Performed by: NURSE PRACTITIONER

## 2019-05-03 PROCEDURE — 3008F PR BODY MASS INDEX (BMI) DOCUMENTED: ICD-10-PCS | Mod: CPTII,S$GLB,, | Performed by: NURSE PRACTITIONER

## 2019-05-03 RX ORDER — HYDROXYZINE HYDROCHLORIDE 10 MG/1
TABLET, FILM COATED ORAL
Qty: 40 TABLET | Refills: 1 | Status: SHIPPED | OUTPATIENT
Start: 2019-05-03 | End: 2020-01-14

## 2019-05-03 RX ORDER — METHOCARBAMOL 750 MG/1
750 TABLET, FILM COATED ORAL 4 TIMES DAILY PRN
Qty: 60 TABLET | Refills: 0 | Status: SHIPPED | OUTPATIENT
Start: 2019-05-03 | End: 2019-06-02

## 2019-05-03 RX ORDER — ETODOLAC 400 MG/1
400 TABLET, FILM COATED ORAL 2 TIMES DAILY
Qty: 60 TABLET | Refills: 0 | Status: SHIPPED | OUTPATIENT
Start: 2019-05-03 | End: 2019-08-22 | Stop reason: SDUPTHER

## 2019-05-03 NOTE — PATIENT INSTRUCTIONS
Benign Paroxysmal Positional Vertigo    Benign paroxysmal positional vertigo is a common condition. You feel as if the room is spinning after changing position, moving your head quickly, or even just rolling over in bed.  Vertigo is a false feeling of motion plus disorientation that makes it seem as though the room is spinning. A vertigo attack may cause sudden nausea, vomiting, and heavy sweating. Severe vertigo causes a loss of balance. You may even fall down.  Vertigo is caused by a problem with the inner ear. The inner ear is located behind the middle ear. It is a part of the balance center of the body. It contains small calcium particles within fluid-filled canals (semi-circular canals). These particles can move out of position as a result of aging, head trauma, or disease of the inner ear. Once that happens, moving your head in certain ways may cause the particles to stimulate the inner ear. This creates the feeling of vertigo.  An episode of vertigo may last seconds, minutes, or hours. Once you are over the first episode of vertigo, it may never return. Sometimes symptoms return off and on over several weeks or longer.  Home care  Follow these guidelines when caring for yourself at home:  · Rest quietly in bed if your symptoms are severe. Change position slowly. There is usually one position that will feel best. This might be lying on one side or lying on your back with your head slightly raised on pillows.  · Do not drive or work with dangerous machinery for one week after symptoms disappear. This is in case symptoms return suddenly.  · Take medicine as prescribed to relieve your symptoms. Unless another medicine was prescribed for nausea, vomiting, and vertigo, you may use over-the-counter motion sickness medicine, such as meclizine or dimenhydrinate.  Follow-up care  Follow up with your healthcare provider, or as directed. Tell your provider about any ringing in the ear or hearing loss.  If you had a  CT or MRI scan, a specialist will review it. You will be told of any new findings that may affect your care.  When to seek medical advice  Call your healthcare provider right away if any of these occur:  · Vertigo gets worse even after taking prescribed medicine  · Repeated vomiting even after taking prescribed medicine  · Increased weakness or fainting  · Severe headache or unusual drowsiness or confusion  · Weakness of an arm or leg or one side of the face  · Difficulty walking  · Difficulty with speech or vision  · Seizure  · Trouble hearing  · Fever of 100.4ºF (38ºC) or higher, or as directed by your healthcare provider  · Fast heart rate  · Chest pain   Date Last Reviewed: 7/10/2015  © 4046-1360 [employer]. [address] [place] [address], [place] [address]. All rights reserved. This information is not intended as a substitute for professional medical care. Always follow your healthcare professional's instructions.

## 2019-05-03 NOTE — PROGRESS NOTES
Subjective:       Patient ID: Niharika Finney is a 53 y.o. female.    Chief Complaint: Dizziness (since Good Friday--pt took Meclinzine for 2 days-- pt states starting Monday she started feeling better but must take her time getting up) and Back Pain    Low-back Pain   This is a new problem. The current episode started in the past 7 days. The problem occurs constantly. The problem has been waxing and waning. Associated symptoms include congestion, myalgias and nausea. Pertinent negatives include no abdominal pain, arthralgias, chest pain, chills, coughing, diaphoresis, fatigue, fever, headaches, joint swelling, rash, sore throat, urinary symptoms, vomiting or weakness. Associated symptoms comments: No nausea is associated with back pain.  The back pain is across lower back and shifts sides associated with certain movements. The symptoms are aggravated by bending and twisting. She has tried nothing for the symptoms.   Dizziness:   Chronicity:  New  Onset: around Good Friday, April 19th.  Progression since onset:  Gradually improving  Frequency - weeks/days included: position changes, bending over, etc.  Severity:  Moderate  Duration:  Off/on all day (but worse with position changes)  Dizziness characteristics:  Sensation of movement, spinning inside head only, trouble focusing eyes and off-balance   Associated symptoms: nausea and light-headedness.no ear pain, no fever, no headaches, no tinnitus, no vomiting, no diaphoresis, no weakness, no visual disturbances, no palpitations, no panic, no facial weakness, no slurred speech, no numbness in extremities and no chest pain.   Reports syncope on Good Friday night only while using bathroom - this more consistent with Vasovagal episode and felt fine until this event.  Since then the dizziness is intermittent with movements such as bending over and turning head, etc.  Aggravated by:  Position changes, bending and getting up  Treatments tried: Meclizine.  Improvements on  treatment:  Moderate   PMH includes: environmental allergies.no head trauma and no head trauma.      Current Outpatient Medications   Medication Sig Dispense Refill    valACYclovir (VALTREX) 500 MG tablet Take 1 tablet (500 mg total) by mouth 2 (two) times daily. (Patient taking differently: Take 500 mg by mouth 2 (two) times daily as needed (cold sore outbreak). ) 180 tablet 3     No current facility-administered medications for this visit.        Past Medical History:   Diagnosis Date    ADD (attention deficit disorder with hyperactivity)     Back pain     Herpes labialis     History of gestational diabetes     HLD (hyperlipidemia) 3/13/2013    Impaired fasting glucose 3/13/2013    Recurrent cold sores     Sciatica        Past Surgical History:   Procedure Laterality Date    APPENDECTOMY      HERNIA REPAIR  02/25/16    REPAIR-HERNIA-INCISIONAL ventral ex mesh N/A 1/28/2016    [facility]    TONSILLECTOMY         Family History   Problem Relation Age of Onset    Diabetes Paternal Grandmother     Pancreatic cancer Mother         passed aage 48    Pancreatic cancer Father         passed age 81    Hyperlipidemia Father     Hypertension Father     Gout Father     Cancer Sister         passed away lung cancer age 54    Hyperlipidemia Sister     Hypertension Sister     Heart disease Sister     Coronary artery disease Neg Hx        Social History     Socioeconomic History    Marital status:      Spouse name: Not on file    Number of children: Not on file    Years of education: Not on file    Highest education level: Not on file   Occupational History    Not on file   Social Needs    Financial resource strain: Not on file    Food insecurity:     Worry: Not on file     Inability: Not on file    Transportation needs:     Medical: Not on file     Non-medical: Not on file   Tobacco Use    Smoking status: Never Smoker    Smokeless tobacco: Never Used    Substance and Sexual Activity    Alcohol use: No    Drug use: No    Sexual activity: Not Currently     Birth control/protection: Post-menopausal   Lifestyle    Physical activity:     Days per week: Not on file     Minutes per session: Not on file    Stress: Not on file   Relationships    Social connections:     Talks on phone: Not on file     Gets together: Not on file     Attends Temple service: Not on file     Active member of club or organization: Not on file     Attends meetings of clubs or organizations: Not on file     Relationship status: Not on file   Other Topics Concern    Not on file   Social History Narrative    Not on file       Review of Systems   Constitutional: Negative for appetite change, chills, diaphoresis, fatigue, fever and unexpected weight change.   HENT: Positive for congestion and rhinorrhea. Negative for ear pain, mouth sores, nosebleeds, postnasal drip, sinus pressure, sneezing, sore throat, tinnitus, trouble swallowing and voice change.    Eyes: Negative for photophobia, pain, discharge, redness, itching and visual disturbance.   Respiratory: Negative for cough, chest tightness and shortness of breath.    Cardiovascular: Negative for chest pain, palpitations and leg swelling.   Gastrointestinal: Positive for nausea. Negative for abdominal pain, blood in stool, constipation, diarrhea and vomiting.   Genitourinary: Negative for dysuria, frequency, hematuria and urgency.   Musculoskeletal: Positive for back pain and myalgias. Negative for arthralgias and joint swelling.   Skin: Negative for color change and rash.   Allergic/Immunologic: Positive for environmental allergies. Negative for immunocompromised state.   Neurological: Positive for dizziness and light-headedness. Negative for seizures, syncope, weakness and headaches.   Hematological: Negative for adenopathy. Does not bruise/bleed easily.   Psychiatric/Behavioral: Negative for agitation, dysphoric mood, sleep  "disturbance and suicidal ideas. The patient is not nervous/anxious.          Objective:     Vitals:    05/03/19 1104 05/03/19 1120   BP: 118/82 120/78   Pulse: 68    Resp: 18    Temp: 98.7 °F (37.1 °C)    TempSrc: Oral    SpO2: 98%    Weight: 90.7 kg (200 lb)    Height: 5' 3.75" (1.619 m)           Physical Exam   Constitutional: She is oriented to person, place, and time. She appears well-developed and well-nourished.   + obesity with Body mass index is 34.6 kg/m².     HENT:   Head: Normocephalic and atraumatic.   Right Ear: External ear normal.   Left Ear: External ear normal.   Nose: Mucosal edema and rhinorrhea present.   Mouth/Throat: Oropharynx is clear and moist. No oropharyngeal exudate.   Enlarged pale boggy turbinates with clear nasal discharge.    + significant nystagmus present with head towards the left ear + Alfonso Hallpike test.   Eyes: Pupils are equal, round, and reactive to light. EOM are normal.   Neck: Normal range of motion. Neck supple. No tracheal deviation present. No thyromegaly present.   Cardiovascular: Normal rate, regular rhythm and normal heart sounds.   No murmur heard.  Pulmonary/Chest: Effort normal and breath sounds normal. No respiratory distress.   Abdominal: Soft. She exhibits no distension.   Musculoskeletal: Normal range of motion. She exhibits no edema.        Lumbar back: She exhibits pain and spasm. She exhibits normal range of motion, no bony tenderness, no swelling and no edema.        Back:    Negative SLRs.  Good ROM of all extremities.  Steady gait.   Lymphadenopathy:     She has no cervical adenopathy.   Neurological: She is alert and oriented to person, place, and time. No cranial nerve deficit. Coordination normal.   Skin: Skin is warm and dry. No rash noted.   Psychiatric: She has a normal mood and affect.         Assessment:         ICD-10-CM ICD-9-CM   1. Benign paroxysmal positional vertigo of left ear H81.12 386.11   2. Dizziness R42 780.4   3. Allergic rhinitis " with postnasal drip J30.9 477.9    R09.82 784.91   4. Acute bilateral low back pain without sciatica M54.5 724.2     338.19       Plan:       Benign paroxysmal positional vertigo of left ear  -  Gave handout on head exercises.  Reports the meclizine helped but makes her drowsy.  Since patient also having AR symptoms - will try a low-dose antihistamine such as Hydroxyzine to see if helps any - 10 mg prn.    -  SEE ENT MD for BPPV workup and can also address the chronic swollen nasal turbinates.    Dizziness  -  Presentation consistent with BPPV on physical exam with + Alfonso Hallpike testing.    Allergic rhinitis with postnasal drip  -     hydrOXYzine HCl (ATARAX) 10 MG Tab; Take 1 to 2 tablets QID prn antihistamine or nausea.  Dispense: 40 tablet; Refill: 1    Acute bilateral low back pain without sciatica  -  Take Etodolac and Robaxin as advised.  Hot compresses and icy hot rubs.  Urinalysis showed no blood in urine and no infection.  -  Return if no improvement or worsening.  -     POCT urinalysis, dipstick or tablet reag  -     etodolac (LODINE) 400 MG tablet; Take 1 tablet (400 mg total) by mouth 2 (two) times daily.  Dispense: 60 tablet; Refill: 0  -     methocarbamol (ROBAXIN) 750 MG Tab; Take 1 tablet (750 mg total) by mouth 4 (four) times daily as needed.  Dispense: 60 tablet; Refill: 0    ##ALL medications were checked to ensure no affect on liver function since patient has HIGH LFTs and has appt with Hepatology for further workup###    Follow up if symptoms worsen or fail to improve.     Patient's Medications   New Prescriptions    ETODOLAC (LODINE) 400 MG TABLET    Take 1 tablet (400 mg total) by mouth 2 (two) times daily.    HYDROXYZINE HCL (ATARAX) 10 MG TAB    Take 1 to 2 tablets QID prn antihistamine or nausea.    METHOCARBAMOL (ROBAXIN) 750 MG TAB    Take 1 tablet (750 mg total) by mouth 4 (four) times daily as needed.   Previous Medications    VALACYCLOVIR (VALTREX) 500 MG TABLET    Take 1 tablet (500  mg total) by mouth 2 (two) times daily.   Modified Medications    No medications on file   Discontinued Medications    No medications on file

## 2019-05-14 ENCOUNTER — OFFICE VISIT (OUTPATIENT)
Dept: HEPATOLOGY | Facility: CLINIC | Age: 54
End: 2019-05-14
Payer: COMMERCIAL

## 2019-05-14 VITALS
HEART RATE: 78 BPM | TEMPERATURE: 99 F | SYSTOLIC BLOOD PRESSURE: 120 MMHG | DIASTOLIC BLOOD PRESSURE: 74 MMHG | WEIGHT: 201.06 LBS | BODY MASS INDEX: 35.62 KG/M2 | HEIGHT: 63 IN | RESPIRATION RATE: 17 BRPM

## 2019-05-14 DIAGNOSIS — R79.89 ELEVATED LFTS: Primary | ICD-10-CM

## 2019-05-14 PROCEDURE — 3008F BODY MASS INDEX DOCD: CPT | Mod: CPTII,S$GLB,, | Performed by: INTERNAL MEDICINE

## 2019-05-14 PROCEDURE — 99999 PR PBB SHADOW E&M-EST. PATIENT-LVL IV: CPT | Mod: PBBFAC,,, | Performed by: INTERNAL MEDICINE

## 2019-05-14 PROCEDURE — 99204 PR OFFICE/OUTPT VISIT, NEW, LEVL IV, 45-59 MIN: ICD-10-PCS | Mod: S$GLB,,, | Performed by: INTERNAL MEDICINE

## 2019-05-14 PROCEDURE — 99204 OFFICE O/P NEW MOD 45 MIN: CPT | Mod: S$GLB,,, | Performed by: INTERNAL MEDICINE

## 2019-05-14 PROCEDURE — 99999 PR PBB SHADOW E&M-EST. PATIENT-LVL IV: ICD-10-PCS | Mod: PBBFAC,,, | Performed by: INTERNAL MEDICINE

## 2019-05-14 PROCEDURE — 3008F PR BODY MASS INDEX (BMI) DOCUMENTED: ICD-10-PCS | Mod: CPTII,S$GLB,, | Performed by: INTERNAL MEDICINE

## 2019-05-14 NOTE — LETTER
May 14, 2019      [place]  [address]  [address]  [facility] [address]           [facility]  [address]  [address]  [place] [address]  Phone: [phone]  Fax: [phone]          Patient: Niharika Finney   MR Number: [id number]   YOB: 1965   Date of Visit: 5/14/2019       Dear Zoraida Helms:    Thank you for referring Niharika Finney to me for evaluation. Attached you will find relevant portions of my assessment and plan of care.    If you have questions, please do not hesitate to call me. I look forward to following Niharika Finney along with you.    Sincerely,    Coni Bonilla MD    Enclosure  CC:  No Recipients    If you would like to receive this communication electronically, please contact [email or web address] or [phone] to request more information on [employer] access.    For providers and/or their staff who would like to refer a patient to [facility], please contact us through our one-stop-shop provider referral line, [facility], at [phone].    If you feel you have received this communication in error or would no longer like to receive these types of communications, please e-mail [email or web address]

## 2019-05-14 NOTE — PROGRESS NOTES
HEPATOLOGY CONSULTATION    Referring Physician: Zoraida Helms NP  Current Corresponding Physician: Zoraida Helms NP    Reason for Consultation: Consultation for evaluation of Elevated Hepatic Enzymes    History of Present Illness: Niharika Finney is a 53 y.o. femalewho has had elevated liver tests dating back to 2007. At that time she had an ALT of 57/AST of 45 and ALKP and Tbil normal. She recalls losing ~35 lbs back in 2008/2009. I note that her liver enzymes were normal at that time (<25). As time has gone on her liver enzymes have increased again. Around that time that she was treated for PNA 02/19 her liver enzymes were quite high (/[id number]; Tbil and ALKP normal). More recnetly 4/4/19 they were lower: /AST 87/ALKP 83, Tbil 0.8. HCV and HBsAg serologies were checked and were negative. There if no history of heavy alcohol and no family history of liver disease. Her current BMI is 35. The patient denies any symptoms of decompensated cirrhosis, including no ascites or edema, cognitive problems that would suggest hepatic encephalopathy, or GI bleeding from varices. She is on no meds that would cause DILI.      Chief Complaint   Patient presents with    Elevated Hepatic Enzymes       Past Medical History:   Diagnosis Date    ADD (attention deficit disorder with hyperactivity)     Back pain     Herpes labialis     History of gestational diabetes     HLD (hyperlipidemia) 3/13/2013    Impaired fasting glucose 3/13/2013    Recurrent cold sores     Sciatica      Outpatient Encounter Medications as of 5/14/2019   Medication Sig Dispense Refill    etodolac (LODINE) 400 MG tablet Take 1 tablet (400 mg total) by mouth 2 (two) times daily. 60 tablet 0    hydrOXYzine HCl (ATARAX) 10 MG Tab Take 1 to 2 tablets QID prn antihistamine or nausea. 40 tablet 1    methocarbamol (ROBAXIN) 750 MG Tab Take 1 tablet (750 mg total) by mouth 4 (four) times daily as needed. 60 tablet 0    valACYclovir (VALTREX) 500  MG tablet Take 1 tablet (500 mg total) by mouth 2 (two) times daily. (Patient taking differently: Take 500 mg by mouth 2 (two) times daily as needed (cold sore outbreak). ) 180 tablet 3     No facility-administered encounter medications on file as of 5/14/2019.      Review of patient's allergies indicates:  No Known Allergies  Family History   Problem Relation Age of Onset    Diabetes Paternal Grandmother     Pancreatic cancer Mother         passed aage 48    Pancreatic cancer Father         passed age 81    Hyperlipidemia Father     Hypertension Father     Gout Father     Cancer Sister         passed away lung cancer age 54    Hyperlipidemia Sister     Hypertension Sister     Heart disease Sister     Coronary artery disease Neg Hx        Social History     Socioeconomic History    Marital status:      Spouse name: Not on file    Number of children: Not on file    Years of education: Not on file    Highest education level: Not on file   Occupational History    Not on file   Social Needs    Financial resource strain: Not on file    Food insecurity:     Worry: Not on file     Inability: Not on file    Transportation needs:     Medical: Not on file     Non-medical: Not on file   Tobacco Use    Smoking status: Never Smoker    Smokeless tobacco: Never Used   Substance and Sexual Activity    Alcohol use: No    Drug use: No    Sexual activity: Not Currently     Birth control/protection: Post-menopausal   Lifestyle    Physical activity:     Days per week: Not on file     Minutes per session: Not on file    Stress: Not on file   Relationships    Social connections:     Talks on phone: Not on file     Gets together: Not on file     Attends Gnosticist service: Not on file     Active member of club or organization: Not on file     Attends meetings of clubs or organizations: Not on file     Relationship status: Not on file   Other Topics Concern    Not on file   Social History Narrative    Not  on file     Review of Systems   Constitutional: Negative.    HENT: Negative.    Eyes: Negative.    Respiratory: Negative.    Cardiovascular: Negative.    Gastrointestinal: Negative.    Genitourinary: Negative.    Musculoskeletal: Negative.    Skin: Negative.    Neurological: Negative.    Psychiatric/Behavioral: Negative.      Vitals:    05/14/19 1337   BP: 120/74   Pulse: 78   Resp: 17   Temp: 98.7 °F (37.1 °C)       Physical Exam   Constitutional: She is oriented to person, place, and time. She appears well-developed and well-nourished.   HENT:   Head: Normocephalic and atraumatic.   Eyes: Pupils are equal, round, and reactive to light. Conjunctivae and EOM are normal. No scleral icterus.   Neck: Normal range of motion. Neck supple. No thyromegaly present.   Cardiovascular: Normal rate, regular rhythm and normal heart sounds.   Pulmonary/Chest: Effort normal and breath sounds normal. She has no rales.   Abdominal: Soft. Bowel sounds are normal. She exhibits no distension and no mass. There is no tenderness.   Musculoskeletal: Normal range of motion. She exhibits no edema.   Neurological: She is alert and oriented to person, place, and time.   Skin: Skin is warm and dry. No rash noted.   Psychiatric: She has a normal mood and affect.   Vitals reviewed.      Lab Results   Component Value Date    GLU 87 04/04/2019    BUN 20 04/04/2019    CREATININE 0.65 04/04/2019    CALCIUM 9.3 04/04/2019     04/04/2019    K 4.2 04/04/2019     04/04/2019    PROT 6.3 04/04/2019    CO2 27 04/04/2019    ANIONGAP 11 04/21/2013    WBC 7.4 04/04/2019    RBC 4.87 04/04/2019    HGB 15.2 04/04/2019    HCT 44.8 04/04/2019    MCV 92.0 04/04/2019    MCH 31.2 04/04/2019    MCHC 33.9 04/04/2019     Lab Results   Component Value Date    RDW 12.9 04/04/2019     04/04/2019    MPV 11.7 04/04/2019    GRAN 4.1 04/21/2013    GRAN 57.5 04/21/2013    LYMPH 2,671 04/04/2019    LYMPH 36.1 04/04/2019    MONO 607 04/04/2019    MONO 8.2  04/04/2019    EOSINOPHIL 0.7 04/04/2019    BASOPHIL 0.4 04/04/2019    EOS 52 04/04/2019    BASO 30 04/04/2019    CHOL 225 (H) 03/08/2019    TRIG 127 03/08/2019    TRIG 150 04/27/2018    HDL 49 (L) 03/08/2019    HDL 45 04/27/2018    CHOLHDL 4.6 03/08/2019    TOTALCHOLEST 6.2 (H) 03/06/2013    ALBUMIN 4.1 04/04/2019    AST 87 (H) 04/04/2019     (H) 04/04/2019    ALKPHOS 83 04/04/2019       Assessment and Plan:  Niharika Finney is a 53 y.o. female with Elevated Hepatic Enzymes  The normalization of liver enzymes with weight loss is suggestive of fatty liver. However, it is also possible that this represents autoimmune hepatitis. The more recent higher liver enzymes could have been due to the pna. I often see elevations in liver enzymes when  pts have a systemic illness. I am recommending that I complete a serologic w/u for elevated liver enzymes. I will stage her liver disease noninvasively with an US and fibroscan. I am deferring a liver biopsy for now.     Return 4 weeks.

## 2019-05-23 ENCOUNTER — PROCEDURE VISIT (OUTPATIENT)
Dept: HEPATOLOGY | Facility: CLINIC | Age: 54
End: 2019-05-23
Attending: INTERNAL MEDICINE
Payer: COMMERCIAL

## 2019-05-23 ENCOUNTER — HOSPITAL ENCOUNTER (OUTPATIENT)
Dept: RADIOLOGY | Facility: HOSPITAL | Age: 54
Discharge: HOME | End: 2019-05-23
Attending: INTERNAL MEDICINE
Payer: COMMERCIAL

## 2019-05-23 DIAGNOSIS — R79.89 ELEVATED LFTS: ICD-10-CM

## 2019-05-23 PROCEDURE — 91200 LIVER ELASTOGRAPHY: CPT | Mod: S$GLB,,, | Performed by: INTERNAL MEDICINE

## 2019-05-23 PROCEDURE — 93975 VASCULAR STUDY: CPT | Mod: 26,,, | Performed by: RADIOLOGY

## 2019-05-23 PROCEDURE — 93975 US ABDOMEN COMP WITH DOPPLER (XPD): ICD-10-PCS | Mod: 26,,, | Performed by: RADIOLOGY

## 2019-05-23 PROCEDURE — 93975 VASCULAR STUDY: CPT | Mod: TC

## 2019-05-23 PROCEDURE — 91200 PR LIVER ELASTOGRAPHY W/OUT IMAG W/INTERP & REPORT: ICD-10-PCS | Mod: S$GLB,,, | Performed by: INTERNAL MEDICINE

## 2019-05-27 NOTE — PROCEDURES
Procedures Fibroscan Procedure     Name: Niharika Finney  Date of Procedure : 2019   :: Coni Bonilla MD  Diagnosis: CRYPTOGENIC    Probe: M    Fibroscan readin.4 KPa    Fibrosis:F3     CAP readin   dB/m    Steatosis: :S2

## 2019-05-30 DIAGNOSIS — M54.50 ACUTE BILATERAL LOW BACK PAIN WITHOUT SCIATICA: ICD-10-CM

## 2019-05-30 RX ORDER — ETODOLAC 400 MG/1
TABLET, FILM COATED ORAL
Qty: 60 TABLET | Refills: 0 | OUTPATIENT
Start: 2019-05-30

## 2019-07-23 ENCOUNTER — OFFICE VISIT (OUTPATIENT)
Dept: HEPATOLOGY | Facility: CLINIC | Age: 54
End: 2019-07-23
Payer: COMMERCIAL

## 2019-07-23 VITALS
BODY MASS INDEX: 36.05 KG/M2 | HEART RATE: 82 BPM | SYSTOLIC BLOOD PRESSURE: 130 MMHG | WEIGHT: 203.5 LBS | RESPIRATION RATE: 17 BRPM | TEMPERATURE: 98 F | DIASTOLIC BLOOD PRESSURE: 70 MMHG | OXYGEN SATURATION: 98 %

## 2019-07-23 DIAGNOSIS — R79.89 ELEVATED LFTS: Primary | ICD-10-CM

## 2019-07-23 DIAGNOSIS — K76.0 FATTY LIVER: ICD-10-CM

## 2019-07-23 PROCEDURE — 99999 PR PBB SHADOW E&M-EST. PATIENT-LVL III: ICD-10-PCS | Mod: PBBFAC,,, | Performed by: INTERNAL MEDICINE

## 2019-07-23 PROCEDURE — 3008F BODY MASS INDEX DOCD: CPT | Mod: CPTII,S$GLB,, | Performed by: INTERNAL MEDICINE

## 2019-07-23 PROCEDURE — 99213 OFFICE O/P EST LOW 20 MIN: CPT | Mod: S$GLB,,, | Performed by: INTERNAL MEDICINE

## 2019-07-23 PROCEDURE — 99999 PR PBB SHADOW E&M-EST. PATIENT-LVL III: CPT | Mod: PBBFAC,,, | Performed by: INTERNAL MEDICINE

## 2019-07-23 PROCEDURE — 3008F PR BODY MASS INDEX (BMI) DOCUMENTED: ICD-10-PCS | Mod: CPTII,S$GLB,, | Performed by: INTERNAL MEDICINE

## 2019-07-23 PROCEDURE — 99213 PR OFFICE/OUTPT VISIT, EST, LEVL III, 20-29 MIN: ICD-10-PCS | Mod: S$GLB,,, | Performed by: INTERNAL MEDICINE

## 2019-07-23 RX ORDER — METHOCARBAMOL 750 MG/1
500 TABLET, FILM COATED ORAL 2 TIMES DAILY PRN
COMMUNITY
End: 2019-08-22 | Stop reason: SDUPTHER

## 2019-07-23 NOTE — PROGRESS NOTES
HEPATOLOGY FOLLOW UP    Referring Physician: Zoraida Helms NP  Current Corresponding Physician: Zoraida Helms NP    Niharika Finney is here for follow up of Elevated Hepatic Enzymes  She is a 53 y.o. femalewho has had elevated liver tests dating back to 2007. At that time she had an ALT of 57/AST of 45 and ALKP and Tbil normal. She recalls losing ~35 lbs back in 2008/2009. I note that her liver enzymes were normal at that time (<25). As time has gone on her liver enzymes have increased again. Around that time that she was treated for PNA 02/19 her liver enzymes were quite high (/[id number]; Tbil and ALKP normal). More recnetly 4/4/19 they were lower: /AST 87/ALKP 83, Tbil 0.8. HCV and HBsAg serologies were checked and were negative. There if no history of heavy alcohol and no family history of liver disease. Her current BMI is 35. She is on no meds that would cause DILI.    Interval History  Since Niharika Finney's last visit she had a full serologic w/u for elevated liver enzymes. This was negative. She also had a fibroscan that suggested F3 fibrosis, S2 steatosis. The patient continues to deny any symptoms of decompensated cirrhosis, including no ascites or edema, cognitive problems that would suggest hepatic encephalopathy, or GI bleeding from varices.    Outpatient Encounter Medications as of 7/23/2019   Medication Sig Dispense Refill    etodolac (LODINE) 400 MG tablet Take 1 tablet (400 mg total) by mouth 2 (two) times daily. 60 tablet 0    methocarbamol (ROBAXIN) 750 MG Tab Take 500 mg by mouth 2 (two) times daily as needed.      valACYclovir (VALTREX) 500 MG tablet Take 1 tablet (500 mg total) by mouth 2 (two) times daily. (Patient taking differently: Take 500 mg by mouth 2 (two) times daily as needed (cold sore outbreak). ) 180 tablet 3    hydrOXYzine HCl (ATARAX) 10 MG Tab Take 1 to 2 tablets QID prn antihistamine or nausea. 40 tablet 1     No facility-administered encounter medications on  file as of 7/23/2019.      Review of patient's allergies indicates:  No Known Allergies  Past Medical History:   Diagnosis Date    ADD (attention deficit disorder with hyperactivity)     Back pain     Herpes labialis     History of gestational diabetes     HLD (hyperlipidemia) 3/13/2013    Impaired fasting glucose 3/13/2013    Recurrent cold sores     Sciatica        Review of Systems   Constitutional: Negative.    HENT: Negative.    Eyes: Negative.    Respiratory: Negative.    Cardiovascular: Negative.    Gastrointestinal: Negative.    Genitourinary: Negative.    Musculoskeletal: Negative.    Skin: Negative.    Neurological: Negative.    Psychiatric/Behavioral: Negative.      Vitals:    07/23/19 1418   BP: 130/70   Pulse: 82   Resp: 17   Temp: 98.1 °F (36.7 °C)       Physical Exam   Constitutional: She is oriented to person, place, and time. She appears well-developed and well-nourished.   HENT:   Head: Normocephalic.   Eyes: Pupils are equal, round, and reactive to light. No scleral icterus.   Neck: Neck supple. No thyromegaly present.   Cardiovascular: Normal rate, regular rhythm and normal heart sounds.   Pulmonary/Chest: Effort normal and breath sounds normal. She has no wheezes.   Abdominal: Soft. She exhibits no distension and no mass. There is no tenderness.   Musculoskeletal: Normal range of motion. She exhibits no edema.   Neurological: She is oriented to person, place, and time.   Skin: Skin is warm and dry. No rash noted.   Psychiatric: She has a normal mood and affect.   Vitals reviewed.        Lab Results   Component Value Date    GLU 87 04/04/2019    BUN 20 04/04/2019    CREATININE 0.65 04/04/2019    CALCIUM 9.3 04/04/2019     04/04/2019    K 4.2 04/04/2019     04/04/2019    PROT 6.3 04/04/2019    CO2 27 04/04/2019    ANIONGAP 11 04/21/2013    WBC 7.4 04/04/2019    RBC 4.87 04/04/2019    HGB 15.2 04/04/2019    HCT 44.8 04/04/2019    MCV 92.0 04/04/2019    MCH 31.2 04/04/2019     MCHC 33.9 04/04/2019     Lab Results   Component Value Date    RDW 12.9 04/04/2019     04/04/2019    MPV 11.7 04/04/2019    GRAN 4.1 04/21/2013    GRAN 57.5 04/21/2013    LYMPH 2,671 04/04/2019    LYMPH 36.1 04/04/2019    MONO 607 04/04/2019    MONO 8.2 04/04/2019    EOSINOPHIL 0.7 04/04/2019    BASOPHIL 0.4 04/04/2019    EOS 52 04/04/2019    BASO 30 04/04/2019    CHOL 225 (H) 03/08/2019    TRIG 127 03/08/2019    TRIG 150 04/27/2018    HDL 49 (L) 03/08/2019    HDL 45 04/27/2018    CHOLHDL 4.6 03/08/2019    TOTALCHOLEST 6.2 (H) 03/06/2013    ALBUMIN 4.1 04/04/2019    AST 87 (H) 04/04/2019     (H) 04/04/2019    ALKPHOS 83 04/04/2019       Assessment and Plan:    Niharika Finney is a 53 y.o. female withElevated Hepatic Enzymes    Based on fibroscan, she has significant fibrosis likely due to LANDAVERDE since her serologic w/u is negative for other causes. She is interested in clinical trials. I will prescreen her for the trials that are currently open. If she does not prequalify, then I will proceed with liver biopsy as part of standard of care. She should get vaccinated for hepatitis A and B.

## 2019-07-23 NOTE — Clinical Note
Please prescreen for clinical trial. If does not meet criteria then I will proceed with liver biopsy as part of soc

## 2019-07-27 PROBLEM — K76.0 FATTY LIVER: Status: ACTIVE | Noted: 2019-07-27

## 2019-07-29 ENCOUNTER — PATIENT MESSAGE (OUTPATIENT)
Dept: HEPATOLOGY | Facility: CLINIC | Age: 54
End: 2019-07-29

## 2019-07-30 ENCOUNTER — TELEPHONE (OUTPATIENT)
Dept: HEPATOLOGY | Facility: CLINIC | Age: 54
End: 2019-07-30

## 2019-08-21 ENCOUNTER — PATIENT MESSAGE (OUTPATIENT)
Dept: FAMILY MEDICINE | Facility: CLINIC | Age: 54
End: 2019-08-21

## 2019-08-21 DIAGNOSIS — M54.50 ACUTE BILATERAL LOW BACK PAIN WITHOUT SCIATICA: ICD-10-CM

## 2019-08-22 ENCOUNTER — PATIENT MESSAGE (OUTPATIENT)
Dept: FAMILY MEDICINE | Facility: CLINIC | Age: 54
End: 2019-08-22

## 2019-08-22 RX ORDER — METHOCARBAMOL 750 MG/1
750 TABLET, FILM COATED ORAL 2 TIMES DAILY PRN
Qty: 40 TABLET | Refills: 0 | Status: SHIPPED | OUTPATIENT
Start: 2019-08-22 | End: 2020-01-14 | Stop reason: SDUPTHER

## 2019-08-22 RX ORDER — ETODOLAC 400 MG/1
400 TABLET, FILM COATED ORAL 2 TIMES DAILY
Qty: 60 TABLET | Refills: 0 | Status: SHIPPED | OUTPATIENT
Start: 2019-08-22 | End: 2020-01-14 | Stop reason: SDUPTHER

## 2019-12-05 DIAGNOSIS — B00.1 RECURRENT COLD SORES: ICD-10-CM

## 2019-12-06 RX ORDER — VALACYCLOVIR HYDROCHLORIDE 500 MG/1
TABLET, FILM COATED ORAL
Qty: 180 TABLET | Refills: 3 | Status: SHIPPED | OUTPATIENT
Start: 2019-12-06 | End: 2021-04-08 | Stop reason: SDUPTHER

## 2020-01-14 ENCOUNTER — OFFICE VISIT (OUTPATIENT)
Dept: FAMILY MEDICINE | Facility: CLINIC | Age: 55
End: 2020-01-14
Payer: COMMERCIAL

## 2020-01-14 VITALS
RESPIRATION RATE: 18 BRPM | TEMPERATURE: 98 F | DIASTOLIC BLOOD PRESSURE: 78 MMHG | OXYGEN SATURATION: 98 % | BODY MASS INDEX: 30.88 KG/M2 | SYSTOLIC BLOOD PRESSURE: 116 MMHG | HEART RATE: 72 BPM | HEIGHT: 63 IN | WEIGHT: 174.25 LBS

## 2020-01-14 DIAGNOSIS — Z12.11 COLON CANCER SCREENING: ICD-10-CM

## 2020-01-14 DIAGNOSIS — S46.911A MUSCLE STRAIN OF RIGHT UPPER ARM, INITIAL ENCOUNTER: ICD-10-CM

## 2020-01-14 DIAGNOSIS — Z12.39 BREAST CANCER SCREENING: ICD-10-CM

## 2020-01-14 DIAGNOSIS — R39.89 URINE DISCOLORATION: Primary | ICD-10-CM

## 2020-01-14 DIAGNOSIS — R82.998 LEUKOCYTES IN URINE: ICD-10-CM

## 2020-01-14 DIAGNOSIS — K75.81 NONALCOHOLIC STEATOHEPATITIS (NASH): ICD-10-CM

## 2020-01-14 DIAGNOSIS — E78.2 HYPERLIPIDEMIA, MIXED: ICD-10-CM

## 2020-01-14 DIAGNOSIS — Z13.0 SCREENING FOR DEFICIENCY ANEMIA: ICD-10-CM

## 2020-01-14 DIAGNOSIS — Z13.1 DIABETES MELLITUS SCREENING: ICD-10-CM

## 2020-01-14 DIAGNOSIS — Z00.00 ENCOUNTER FOR ANNUAL PHYSICAL EXAM: ICD-10-CM

## 2020-01-14 DIAGNOSIS — Z13.29 THYROID DISORDER SCREEN: ICD-10-CM

## 2020-01-14 DIAGNOSIS — K74.00 LIVER FIBROSIS: ICD-10-CM

## 2020-01-14 LAB
BILIRUB SERPL-MCNC: NEGATIVE MG/DL
BLOOD URINE, POC: NEGATIVE
COLOR, POC UA: YELLOW
GLUCOSE UR QL STRIP: NORMAL
KETONES UR QL STRIP: NEGATIVE
LEUKOCYTE ESTERASE URINE, POC: NORMAL
NITRITE, POC UA: NEGATIVE
PH, POC UA: 5
PROTEIN, POC: NEGATIVE
SPECIFIC GRAVITY, POC UA: 1.01
UROBILINOGEN, POC UA: NORMAL

## 2020-01-14 PROCEDURE — 81001 POCT URINALYSIS, DIPSTICK OR TABLET REAGENT, AUTOMATED, WITH MICROSCOP: ICD-10-PCS | Mod: S$GLB,,, | Performed by: NURSE PRACTITIONER

## 2020-01-14 PROCEDURE — 99999 PR PBB SHADOW E&M-EST. PATIENT-LVL V: ICD-10-PCS | Mod: PBBFAC,,, | Performed by: NURSE PRACTITIONER

## 2020-01-14 PROCEDURE — 3008F BODY MASS INDEX DOCD: CPT | Mod: CPTII,S$GLB,, | Performed by: NURSE PRACTITIONER

## 2020-01-14 PROCEDURE — 87086 URINE CULTURE/COLONY COUNT: CPT

## 2020-01-14 PROCEDURE — 3008F PR BODY MASS INDEX (BMI) DOCUMENTED: ICD-10-PCS | Mod: CPTII,S$GLB,, | Performed by: NURSE PRACTITIONER

## 2020-01-14 PROCEDURE — 81001 URINALYSIS AUTO W/SCOPE: CPT | Mod: S$GLB,,, | Performed by: NURSE PRACTITIONER

## 2020-01-14 PROCEDURE — 99999 PR PBB SHADOW E&M-EST. PATIENT-LVL V: CPT | Mod: PBBFAC,,, | Performed by: NURSE PRACTITIONER

## 2020-01-14 PROCEDURE — 99214 PR OFFICE/OUTPT VISIT, EST, LEVL IV, 30-39 MIN: ICD-10-PCS | Mod: 25,S$GLB,, | Performed by: NURSE PRACTITIONER

## 2020-01-14 PROCEDURE — 99214 OFFICE O/P EST MOD 30 MIN: CPT | Mod: 25,S$GLB,, | Performed by: NURSE PRACTITIONER

## 2020-01-14 RX ORDER — ETODOLAC 400 MG/1
400 TABLET, FILM COATED ORAL 2 TIMES DAILY
Qty: 60 TABLET | Refills: 0 | Status: SHIPPED | OUTPATIENT
Start: 2020-01-14 | End: 2020-02-07

## 2020-01-14 RX ORDER — METHOCARBAMOL 750 MG/1
750 TABLET, FILM COATED ORAL 2 TIMES DAILY PRN
Qty: 40 TABLET | Refills: 0 | Status: SHIPPED | OUTPATIENT
Start: 2020-01-14 | End: 2020-03-19

## 2020-01-14 NOTE — PATIENT INSTRUCTIONS
Low-Fat Diet    A low-fat diet will help you lose weight. It also can lower cholesterol and prevent symptoms of gallbladder disease. The average American diet contains up to 50% fat. This means that 50% of all calories come from fat (about 80 grams to 100 grams of fat per day). Choosing normal portions of foods from the list below can help lower your fat intake. Experts recommend that only 20% to 35% of your daily calories come from fat. The remaining 65% to 80% of calories will come from protein and carbohydrates. This is much healthier for you.  Breads  Ok: Whole-wheat or rye bread, christopher or soda crackers, tonny toast, plain rolls, bagels, English muffins  Avoid: Rolls and breads containing whole milk or egg; waffles, pancakes, biscuits, corn bread; cheese crackers, other flavored crackers, pastries, doughnuts  Cereals  Ok: Oatmeal, whole-wheat, bran, multigrain, rice  Avoid: Granola or other cereals that have oil, coconut, or more than 2 grams of fat per serving  Cheese and eggs  Ok: Cheeses labeled low-fat; 3 whole eggs per week; egg whites and egg substitutes as desired  Avoid: All other cheeses  Desserts  Ok: Gelatin, slushy, felicia food cake, meringues, nonfat yogurt, and puddings or sherbet made with nonfat milk  Avoid: Any other store-bought desserts, or desserts that have fat, whole milk, cream, chocolate, and coconut  Drinks  Ok: Nonfat milk, coffee, tea, fizzy (carbonated) drinks  Avoid: Whole and reduced-fat milk, evaporated and condensed milk, hot chocolate mixes, milk shakes, malts, eggnog  Fats  Ok: You may have up to 3 teaspoons of fat daily. This can be butter, margarine, mayonnaise, or healthy oils (canola or olive)  Avoid: Cream, nondairy creams, cream cheese, gravies, and cream sauces  Fruits  Ok: All fruits made without fat  Avoid: Coconut, olives  Meats, poultry, fish  Ok: Limit meat to 6 ounces daily (broiled, roasted, baked, grilled, or boiled). Buy lean cuts, and trim off the fat. Try  beef, fish, lamb, pork, and canned fish packed in water; also chicken and turkey with the skin removed.  Avoid: Fried meats, fish, or poultry; fried eggs, and fish canned in oils; fatty meats such as cook, sausage, corned beef, hot dogs, and lunch meats; meats with gravies and sauces  Potatoes, beans, pasta  Ok: Dried beans, split peas, lentils, potatoes, rice, pasta made without added fat  Avoid: French fries, potato chips, potatoes prepared with butter, refried beans  Soups  Ok: Clear broth soups without fat and with allowed vegetables  Avoid: Cream-based soups  Vegetables  Ok: Fresh, frozen, canned or dried vegetables, all made without added fat  Avoid: Fried vegetables and those prepared with butter, cream, sauces  Miscellaneous  Ok: Salt, sugar, jelly, hard candy, marshmallows, honey, syrup, spices and herbs, mustard, ketchup, lemon, and vinegar. Try to limit sweets and added sugars.  Avoid: Chocolate, nuts, coconut, and cream candies; sunflower, sesame, and other seeds; fried foods; cream sauces and gravies; pizza  Date Last Reviewed: 8/1/2016 © 2000-2017 [employer]. [address] [place] [id number]. All rights reserved. This information is not intended as a substitute for professional medical care. Always follow your healthcare professional's instructions.

## 2020-01-14 NOTE — PROGRESS NOTES
Subjective:       Patient ID: Niharika Finney is a 54 y.o. female.    Chief Complaint: Arm Pain (right arm pain) and Hematuria (patient report having blood in her urine)    Patient is a 54-year-old white female with a history of gestational diabetes, hyperlipidemia, recurrent cold sores, Elevated Liver Enzymes and a history of Pneumonia in Feb. 2019 that is here today for complaint of right arm pain and red discoloration in urine - questionable blood in urine but no urinary symptoms.  See notes below on the current complaints.    Patient has had elevated liver enzymes with an AST of 190 and an ALT of 272 with wellness labs in March 2019 .  Patient had not had any recent blood work to evaluate liver functions so unsure if this was a new finding or not.  Patient was recently ill with pneumonia and was taking over-the-counter medicines at end of Feb. 2019.  Advised patient to avoid all Tylenol products.  Patient drinks rarely but told to avoid all alcohol as well and patient has been following a low-fat diet for the past month.  Liver enzymes improved but still HIGH in April 2019 with AST 87 and .  Negative Hepatitis panel and not on any supplements.  Referred to Hepatology for full liver workup to rule out autoimmune disorders.  Patient seen Hepatologist and + for Significant FIBROSIS on Fibroscan with LANDAVERDE - was advised to get MRI of liver and possible Liver Biopsy but did not have further testing due to cost - strongly advised patient to reach out to liver specialist about options.      Patient has history of Hyperlipidemia.  She was put on Atorvastatin and Rosuvastatin in the past but stopped taking medication due to myalgias and has not been on medication for the past year.  Total cholesterol was high at 225 with an  when checked in March 2019.  However patient's liver enzymes were high at that point so we did not address cholesterol with medication until liver enzymes have lowered. Due to recheck  all labs in March 2020 for repeat wellness exam    Arm Pain    Incident onset: started 6 to 8 weeks ago. Incident [place]: [place] the park - using a rowing exercise machine - the drawback was TIGHT and pulled her forward and experienced right arm pain since. Injury mechanism: a pulling injury from row machine. The pain is present in the right elbow and upper right arm. The quality of the pain is described as aching, burning and shooting. The pain radiates to the right arm. Pain scale: 5/10 and intermittently flares up with certain movements. The pain has been intermittent since the incident. Pertinent negatives include no chest pain, numbness or tingling. The symptoms are aggravated by movement and lifting. She has tried NSAIDs (Lodine and Robaxin as needed) for the symptoms. The treatment provided moderate relief.   Hematuria   This is a new problem. Episode onset: Reports she noted dark red color of urine on Sunday; episodes Sunday and yesterday; no red color today.  NO urinary symptoms. The problem has been gradually improving since onset. Her pain is at a severity of 0/10. She is experiencing no pain. She describes her urine color as dark red (dark red to purple in color - did eat beets that may have discolored urine). Irritative symptoms do not include frequency, nocturia or urgency. Pertinent negatives include no abdominal pain, dysuria, fever, flank pain, hesitancy, inability to urinate, nausea or vomiting.       Current Outpatient Medications   Medication Sig Dispense Refill    etodolac (LODINE) 400 MG tablet Take 1 tablet (400 mg total) by mouth 2 (two) times daily. 60 tablet 0    methocarbamol (ROBAXIN) 750 MG Tab Take 1 tablet (750 mg total) by mouth 2 (two) times daily as needed. 40 tablet 0    valACYclovir (VALTREX) 500 MG tablet TAKE 1 TABLET TWICE A  tablet 3     No current facility-administered medications for this visit.        Past Medical History:   Diagnosis Date    ADD (attention deficit  disorder with hyperactivity)     Back pain     Fatty liver 7/27/2019    Herpes labialis     History of gestational diabetes     HLD (hyperlipidemia) 3/13/2013    Impaired fasting glucose 3/13/2013    Recurrent cold sores     Sciatica        Past Surgical History:   Procedure Laterality Date    APPENDECTOMY      HERNIA REPAIR  02/25/16    TONSILLECTOMY         Family History   Problem Relation Age of Onset    Diabetes Paternal Grandmother     Pancreatic cancer Mother         passed aage 48    Pancreatic cancer Father         passed age 81    Hyperlipidemia Father     Hypertension Father     Gout Father     Cancer Sister         passed away lung cancer age 54    Hyperlipidemia Sister     Hypertension Sister     Heart disease Sister     Coronary artery disease Neg Hx        Social History     Socioeconomic History    Marital status:      Spouse name: Not on file    Number of children: Not on file    Years of education: Not on file    Highest education level: Not on file   Occupational History    Not on file   Social Needs    Financial resource strain: Not very hard    Food insecurity:     Worry: Never true     Inability: Never true    Transportation needs:     Medical: No     Non-medical: No   Tobacco Use    Smoking status: Never Smoker    Smokeless tobacco: Never Used   Substance and Sexual Activity    Alcohol use: No     Frequency: Never     Drinks per session: Patient refused     Binge frequency: Patient refused    Drug use: No    Sexual activity: Not Currently     Birth control/protection: Post-menopausal   Lifestyle    Physical activity:     Days per week: 5 days     Minutes per session: 20 min    Stress: Only a little   Relationships    Social connections:     Talks on phone: More than three times a week     Gets together: Patient refused     Attends Orthodox service: Not on file     Active member of club or organization: No     Attends meetings of clubs or  "organizations: Patient refused     Relationship status:    Other Topics Concern    Not on file   Social History Narrative    Not on file       Review of Systems   Constitutional: Positive for activity change. Negative for fever and unexpected weight change.   HENT: Negative for hearing loss, rhinorrhea and trouble swallowing.    Eyes: Negative for discharge and visual disturbance.   Respiratory: Negative for chest tightness and wheezing.    Cardiovascular: Negative for chest pain and palpitations.   Gastrointestinal: Negative for abdominal pain, blood in stool, constipation, diarrhea, nausea and vomiting.   Endocrine: Negative for polydipsia and polyuria.   Genitourinary: Positive for hematuria. Negative for difficulty urinating, dysuria, flank pain, frequency, hesitancy, menstrual problem, nocturia and urgency.   Musculoskeletal: Positive for arthralgias. Negative for joint swelling and neck pain.   Neurological: Positive for weakness. Negative for tingling, numbness and headaches.   Psychiatric/Behavioral: Negative for confusion and dysphoric mood.         Objective:     Vitals:    01/14/20 0914   BP: 116/78   BP Location: Right arm   Patient Position: Sitting   BP Method: Large (Manual)   Pulse: 72   Resp: 18   Temp: 98.4 °F (36.9 °C)   TempSrc: Oral   SpO2: 98%   Weight: 79.1 kg (174 lb 4.4 oz)   Height: 5' 3" (1.6 m)          Physical Exam   Constitutional: She is oriented to person, place, and time. She appears well-developed and well-nourished.   + obesity with Body mass index is 30.87 kg/m².   HENT:   Head: Normocephalic and atraumatic.   Right Ear: External ear normal.   Left Ear: External ear normal.   Nose: Nose normal.   Mouth/Throat: Oropharynx is clear and moist. No oropharyngeal exudate.   Eyes: Pupils are equal, round, and reactive to light. EOM are normal.   Neck: Normal range of motion. Neck supple. No tracheal deviation present. No thyromegaly present.   Cardiovascular: Normal rate, " regular rhythm and normal heart sounds.   No murmur heard.  Pulmonary/Chest: Effort normal and breath sounds normal. No respiratory distress.   Abdominal: Soft. She exhibits no distension.   Musculoskeletal: Normal range of motion. She exhibits no edema.   Lymphadenopathy:     She has no cervical adenopathy.   Neurological: She is alert and oriented to person, place, and time. No cranial nerve deficit. Coordination normal.   Skin: Skin is warm and dry. No rash noted.   Psychiatric: She has a normal mood and affect.       Office Visit on 01/14/2020   Component Date Value Ref Range Status    Color, UA 01/14/2020 yellow   Final    Spec Grav UA 01/14/2020 1.010   Final    pH, UA 01/14/2020 5   Final    WBC, UA 01/14/2020 ++   Final    Nitrite, UA 01/14/2020 negative   Final    Protein 01/14/2020 negative   Final    Glucose, UA 01/14/2020 normal   Final    Ketones, UA 01/14/2020 negative   Final    Urobilinogen, UA 01/14/2020 normal   Final    Bilirubin 01/14/2020 negative   Final    Blood, UA 01/14/2020 negative   Final       Assessment:         ICD-10-CM ICD-9-CM   1. Urine discoloration R39.89 791.9   2. Leukocytes in urine R82.998 791.7   3. Muscle strain of right upper arm, initial encounter S46.911A 840.9   4. Hyperlipidemia, mixed E78.2 272.2   5. Nonalcoholic steatohepatitis (LANDAVERDE) K75.81 571.8   6. Screening for deficiency anemia Z13.0 V78.1   7. Thyroid disorder screen Z13.29 V77.0   8. Liver fibrosis K74.0 571.5   9. Colon cancer screening Z12.11 V76.51   10. Diabetes mellitus screening Z13.1 V77.1   11. Breast cancer screening Z12.39 V76.10   12. Encounter for annual physical exam Z00.00 V70.0       Plan:       Urine discoloration  -  urinalysis showed no blood so the possible discoloration could have been secondary to the beets because the discoloration is no longer present.  -     POCT urinalysis, dipstick or tablet reag    Leukocytes in urine  -  +2 leukocytes in urine but rest of urinalysis is  negative and patient is asymptomatic so will get culture before treating  -     Urine culture    Muscle strain of right upper arm, initial encounter  -  positive for muscle strain.  Take etodolac and Robaxin twice daily for the next 2-4 weeks.  -     etodolac (LODINE) 400 MG tablet; Take 1 tablet (400 mg total) by mouth 2 (two) times daily.  Dispense: 60 tablet; Refill: 0  -     methocarbamol (ROBAXIN) 750 MG Tab; Take 1 tablet (750 mg total) by mouth 2 (two) times daily as needed.  Dispense: 40 tablet; Refill: 0    Hyperlipidemia, mixed  -  continue with low-fat diet and lifestyle modifications.  Will recheck cholesterol levels in March 2024 wellness exam  -     Cancel: Lipid panel; Future; Expected date: 01/14/2020  -     Lipid panel; Future; Expected date: 01/14/2020    Nonalcoholic steatohepatitis (LANDAVERDE)  -  call hepatologist to discuss further testing    Screening for deficiency anemia  -     Cancel: CBC auto differential; Future; Expected date: 01/14/2020  -     CBC auto differential; Future; Expected date: 01/14/2020    Thyroid disorder screen  -     Cancel: TSH; Future; Expected date: 01/14/2020  -     TSH; Future; Expected date: 01/14/2020    Liver fibrosis  -  call hepatologist to discuss further testing    Colon cancer screening  -  complete fit testing in March 2020.  [employer]Kit was given to patient on 1/14/2020 1:22 PM   -     Fecal Immunochemical Test (iFOBT); Future; Expected date: 01/14/2020    Diabetes mellitus screening  -     Cancel: Comprehensive metabolic panel; Future; Expected date: 01/14/2020  -     Cancel: Hemoglobin A1c; Future; Expected date: 01/14/2020  -     Hemoglobin A1c; Future; Expected date: 01/14/2020  -     Comprehensive metabolic panel; Future; Expected date: 01/14/2020    Breast cancer screening  -     Mammo Digital Screening Bilat w/ Shantanu; Future; Expected date: 01/14/2020    Encounter for annual physical exam  -     Cancel: CBC auto differential; Future; Expected date:  01/14/2020  -     Cancel: Comprehensive metabolic panel; Future; Expected date: 01/14/2020  -     Cancel: Lipid panel; Future; Expected date: 01/14/2020  -     Cancel: Hemoglobin A1c; Future; Expected date: 01/14/2020  -     Cancel: TSH; Future; Expected date: 01/14/2020  -     TSH; Future; Expected date: 01/14/2020  -     Hemoglobin A1c; Future; Expected date: 01/14/2020  -     Lipid panel; Future; Expected date: 01/14/2020  -     Comprehensive metabolic panel; Future; Expected date: 01/14/2020  -     CBC auto differential; Future; Expected date: 01/14/2020      Follow up in about 2 months (around 3/14/2020) for fasting labs and WELLNESS EXAM.     Patient's Medications   New Prescriptions    No medications on file   Previous Medications    VALACYCLOVIR (VALTREX) 500 MG TABLET    TAKE 1 TABLET TWICE A DAY   Modified Medications    Modified Medication Previous Medication    ETODOLAC (LODINE) 400 MG TABLET etodolac (LODINE) 400 MG tablet       Take 1 tablet (400 mg total) by mouth 2 (two) times daily.    Take 1 tablet (400 mg total) by mouth 2 (two) times daily.    METHOCARBAMOL (ROBAXIN) 750 MG TAB methocarbamol (ROBAXIN) 750 MG Tab       Take 1 tablet (750 mg total) by mouth 2 (two) times daily as needed.    Take 1 tablet (750 mg total) by mouth 2 (two) times daily as needed.   Discontinued Medications    HYDROXYZINE HCL (ATARAX) 10 MG TAB    Take 1 to 2 tablets QID prn antihistamine or nausea.

## 2020-01-15 LAB — BACTERIA UR CULT: NO GROWTH

## 2020-02-07 DIAGNOSIS — S46.911A MUSCLE STRAIN OF RIGHT UPPER ARM, INITIAL ENCOUNTER: ICD-10-CM

## 2020-02-07 RX ORDER — ETODOLAC 400 MG/1
TABLET, FILM COATED ORAL
Qty: 60 TABLET | Refills: 0 | Status: SHIPPED | OUTPATIENT
Start: 2020-02-07

## 2020-03-10 ENCOUNTER — HOSPITAL ENCOUNTER (OUTPATIENT)
Dept: RADIOLOGY | Facility: HOSPITAL | Age: 55
Discharge: HOME | End: 2020-03-10
Attending: NURSE PRACTITIONER
Payer: COMMERCIAL

## 2020-03-10 ENCOUNTER — PATIENT MESSAGE (OUTPATIENT)
Dept: FAMILY MEDICINE | Facility: CLINIC | Age: 55
End: 2020-03-10

## 2020-03-10 DIAGNOSIS — Z12.39 BREAST CANCER SCREENING: ICD-10-CM

## 2020-03-10 PROCEDURE — 77063 MAMMO DIGITAL SCREENING BILAT WITH TOMOSYNTHESIS_CAD: ICD-10-PCS | Mod: 26,,, | Performed by: RADIOLOGY

## 2020-03-10 PROCEDURE — 77063 BREAST TOMOSYNTHESIS BI: CPT | Mod: 26,,, | Performed by: RADIOLOGY

## 2020-03-10 PROCEDURE — 77067 MAMMO DIGITAL SCREENING BILAT WITH TOMOSYNTHESIS_CAD: ICD-10-PCS | Mod: 26,,, | Performed by: RADIOLOGY

## 2020-03-10 PROCEDURE — 77067 SCR MAMMO BI INCL CAD: CPT | Mod: 26,,, | Performed by: RADIOLOGY

## 2020-03-10 PROCEDURE — 77067 SCR MAMMO BI INCL CAD: CPT | Mod: TC

## 2020-03-11 LAB
ALBUMIN SERPL-MCNC: 4.6 G/DL (ref 3.6–5.1)
ALBUMIN/GLOB SERPL: 1.9 (CALC) (ref 1–2.5)
ALP SERPL-CCNC: 78 U/L (ref 37–153)
ALT SERPL-CCNC: 15 U/L (ref 6–29)
AST SERPL-CCNC: 20 U/L (ref 10–35)
BASOPHILS # BLD AUTO: 19 CELLS/UL (ref 0–200)
BASOPHILS NFR BLD AUTO: 0.3 %
BILIRUB SERPL-MCNC: 0.7 MG/DL (ref 0.2–1.2)
BUN SERPL-MCNC: 20 MG/DL (ref 7–25)
BUN/CREAT SERPL: NORMAL (CALC) (ref 6–22)
CALCIUM SERPL-MCNC: 9.8 MG/DL (ref 8.6–10.4)
CHLORIDE SERPL-SCNC: 105 MMOL/L (ref 98–110)
CHOLEST SERPL-MCNC: 222 MG/DL
CHOLEST/HDLC SERPL: 4.1 (CALC)
CO2 SERPL-SCNC: 27 MMOL/L (ref 20–32)
CREAT SERPL-MCNC: 0.62 MG/DL (ref 0.5–1.05)
EOSINOPHIL # BLD AUTO: 82 CELLS/UL (ref 15–500)
EOSINOPHIL NFR BLD AUTO: 1.3 %
ERYTHROCYTE [DISTWIDTH] IN BLOOD BY AUTOMATED COUNT: 12.5 % (ref 11–15)
GFRSERPLBLD MDRD-ARVRAT: 103 ML/MIN/1.73M2
GLOBULIN SER CALC-MCNC: 2.4 G/DL (CALC) (ref 1.9–3.7)
GLUCOSE SERPL-MCNC: 94 MG/DL (ref 65–99)
HBA1C MFR BLD: 5.5 % OF TOTAL HGB
HCT VFR BLD AUTO: 42.8 % (ref 35–45)
HDLC SERPL-MCNC: 54 MG/DL
HGB BLD-MCNC: 14.1 G/DL (ref 11.7–15.5)
LDLC SERPL CALC-MCNC: 144 MG/DL (CALC)
LYMPHOCYTES # BLD AUTO: 2192 CELLS/UL (ref 850–3900)
LYMPHOCYTES NFR BLD AUTO: 34.8 %
MCH RBC QN AUTO: 30.6 PG (ref 27–33)
MCHC RBC AUTO-ENTMCNC: 32.9 G/DL (ref 32–36)
MCV RBC AUTO: 92.8 FL (ref 80–100)
MONOCYTES # BLD AUTO: 397 CELLS/UL (ref 200–950)
MONOCYTES NFR BLD AUTO: 6.3 %
NEUTROPHILS # BLD AUTO: 3610 CELLS/UL (ref 1500–7800)
NEUTROPHILS NFR BLD AUTO: 57.3 %
NONHDLC SERPL-MCNC: 168 MG/DL (CALC)
PLATELET # BLD AUTO: 194 THOUSAND/UL (ref 140–400)
PMV BLD REES-ECKER: 11.6 FL (ref 7.5–12.5)
POTASSIUM SERPL-SCNC: 4.3 MMOL/L (ref 3.5–5.3)
PROT SERPL-MCNC: 7 G/DL (ref 6.1–8.1)
RBC # BLD AUTO: 4.61 MILLION/UL (ref 3.8–5.1)
SODIUM SERPL-SCNC: 141 MMOL/L (ref 135–146)
TRIGL SERPL-MCNC: 122 MG/DL
TSH SERPL-ACNC: 1.15 MIU/L
WBC # BLD AUTO: 6.3 THOUSAND/UL (ref 3.8–10.8)

## 2020-03-17 ENCOUNTER — OFFICE VISIT (OUTPATIENT)
Dept: FAMILY MEDICINE | Facility: CLINIC | Age: 55
End: 2020-03-17
Payer: COMMERCIAL

## 2020-03-17 VITALS
SYSTOLIC BLOOD PRESSURE: 118 MMHG | OXYGEN SATURATION: 98 % | TEMPERATURE: 98 F | RESPIRATION RATE: 16 BRPM | DIASTOLIC BLOOD PRESSURE: 82 MMHG | HEART RATE: 83 BPM | BODY MASS INDEX: 28.04 KG/M2 | HEIGHT: 65 IN | WEIGHT: 168.31 LBS

## 2020-03-17 DIAGNOSIS — K74.00 LIVER FIBROSIS: ICD-10-CM

## 2020-03-17 DIAGNOSIS — Z11.4 SCREENING FOR HIV WITHOUT PRESENCE OF RISK FACTORS: ICD-10-CM

## 2020-03-17 DIAGNOSIS — K75.81 NONALCOHOLIC STEATOHEPATITIS (NASH): ICD-10-CM

## 2020-03-17 DIAGNOSIS — Z13.0 SCREENING FOR DEFICIENCY ANEMIA: ICD-10-CM

## 2020-03-17 DIAGNOSIS — Z00.00 ANNUAL PHYSICAL EXAM: Primary | ICD-10-CM

## 2020-03-17 DIAGNOSIS — E78.2 HYPERLIPIDEMIA, MIXED: ICD-10-CM

## 2020-03-17 DIAGNOSIS — Z13.29 THYROID DISORDER SCREEN: ICD-10-CM

## 2020-03-17 DIAGNOSIS — Z13.1 DIABETES MELLITUS SCREENING: ICD-10-CM

## 2020-03-17 PROBLEM — R03.0 BORDERLINE HIGH BLOOD PRESSURE: Status: RESOLVED | Noted: 2019-03-12 | Resolved: 2020-03-17

## 2020-03-17 PROBLEM — R79.89 ELEVATED LFTS: Status: RESOLVED | Noted: 2019-03-12 | Resolved: 2020-03-17

## 2020-03-17 PROCEDURE — 99999 PR PBB SHADOW E&M-EST. PATIENT-LVL IV: CPT | Mod: PBBFAC,,, | Performed by: NURSE PRACTITIONER

## 2020-03-17 PROCEDURE — 99396 PR PREVENTIVE VISIT,EST,40-64: ICD-10-PCS | Mod: S$GLB,,, | Performed by: NURSE PRACTITIONER

## 2020-03-17 PROCEDURE — 99999 PR PBB SHADOW E&M-EST. PATIENT-LVL IV: ICD-10-PCS | Mod: PBBFAC,,, | Performed by: NURSE PRACTITIONER

## 2020-03-17 PROCEDURE — 99396 PREV VISIT EST AGE 40-64: CPT | Mod: S$GLB,,, | Performed by: NURSE PRACTITIONER

## 2020-03-17 NOTE — PROGRESS NOTES
Subjective:       Patient ID: Niharika Finney is a 54 y.o. female.    Chief Complaint: Annual Exam    Patient is a 54-year-old white female with a history of gestational diabetes, hyperlipidemia, recurrent cold sores, Elevated Liver Enzymes with significant Liver Fibrosis/LANDAVERDE, and a history of Pneumonia in Feb. 2019 that is here today for annual physical exam with fasting lab results.     Patient has had elevated liver enzymes noted since wellness labs in March 2019 .  Patient seen Hepatologist and + for Significant FIBROSIS on Fibroscan with LANDAVERDE - was advised to get MRI of liver and possible Liver Biopsy but did not have further testing due to cost but states she is has now met deductible and ready to schedule further testing - advised to contact her Hepatology specialist for scheduling. Liver enzymes are much improved with dietary modifications.     Patient has history of Hyperlipidemia.  She was put on Atorvastatin and Rosuvastatin in the past but stopped taking medication due to myalgias and has not been on medication for the past year.  Total cholesterol was high at 225 with an  when checked in March 2019.  However patient's liver enzymes were high at that point so we did not address cholesterol with medication until liver enzymes have lowered. Now total cholesterol is 222 with HDL 54 and LDL is down to 144  With lifestyle modifications as well as liver enzymes are much improved.  Advised to continue to work on diet and lifestyle modifications.  NO medications indicated at this time.    Wellness labs:  -  CBC WNL  -  CMP WNL  -  Cholesterol discussed above.  -  TSH WNL    Health maintenance:  -  Declined flu vaccine  -  HIV testing with next blood draw.       Component      Latest Ref Rng & Units 3/10/2020 4/4/2019   WBC      3.8 - 10.8 Thousand/uL 6.3 7.4   RBC      3.80 - 5.10 Million/uL 4.61 4.87   Hemoglobin      11.7 - 15.5 g/dL 14.1 15.2   Hematocrit      35.0 - 45.0 % 42.8 44.8   MCV      80.0 -  100.0 fL 92.8 92.0   MCH      27.0 - 33.0 pg 30.6 31.2   MCHC      32.0 - 36.0 g/dL 32.9 33.9   RDW      11.0 - 15.0 % 12.5 12.9   Platelets      140 - 400 Thousand/uL 194 171   MPV      7.5 - 12.5 fL 11.6 11.7   Neutrophils Absolute      1,500 - 7,800 cells/uL 3,610 4,040   Lymph #      850 - 3,900 cells/uL 2,192 2,671   Mono #      200 - 950 cells/uL 397 607   Eos #      15 - 500 cells/uL 82 52   Baso #      0 - 200 cells/uL 19 30   Neutrophils Relative      % 57.3 54.6   Lymph%      % 34.8 36.1   Mono%      % 6.3 8.2   Eosinophil%      % 1.3 0.7   Basophil%      % 0.3 0.4   Glucose      65 - 99 mg/dL 94 87   BUN, Bld      7 - 25 mg/dL 20 20   Creatinine      0.50 - 1.05 mg/dL 0.62 0.65   eGFR if non       > OR = 60 mL/min/1.73m2 103 102   eGFR if       > OR = 60 mL/min/1.73m2 119 118   BUN/Creatinine Ratio      6 - 22 (calc) NOT APPLICABLE NOT APPLICABLE   Sodium      135 - 146 mmol/L 141 140   Potassium      3.5 - 5.3 mmol/L 4.3 4.2   Chloride      98 - 110 mmol/L 105 106   CO2      20 - 32 mmol/L 27 27   Calcium      8.6 - 10.4 mg/dL 9.8 9.3   PROTEIN TOTAL      6.1 - 8.1 g/dL 7.0 6.3   Albumin      3.6 - 5.1 g/dL 4.6 4.1   Globulin, Total      1.9 - 3.7 g/dL (calc) 2.4 2.2   Albumin/Globulin Ratio      1.0 - 2.5 (calc) 1.9 1.9   BILIRUBIN TOTAL      0.2 - 1.2 mg/dL 0.7 0.8   Alkaline Phosphatase      37 - 153 U/L 78 83   AST      10 - 35 U/L 20 87 (H)   ALT      6 - 29 U/L 15 140 (H)   Cholesterol      <200 mg/dL 222 (H)    HDL      > OR = 50 mg/dL 54    Triglycerides      <150 mg/dL 122    LDL Cholesterol External      mg/dL (calc) 144 (H)    Hdl/Cholesterol Ratio      <5.0 (calc) 4.1    Non HDL Chol. (LDL+VLDL)      <130 mg/dL (calc) 168 (H)    TSH      mIU/L 1.15 2.79   Hemoglobin A1C External      <5.7 % of total Hgb 5.5      Component      Latest Ref Rng & Units 3/8/2019   WBC      3.8 - 10.8 Thousand/uL    RBC      3.80 - 5.10 Million/uL    Hemoglobin      11.7 - 15.5 g/dL     Hematocrit      35.0 - 45.0 %    MCV      80.0 - 100.0 fL    MCH      27.0 - 33.0 pg    MCHC      32.0 - 36.0 g/dL    RDW      11.0 - 15.0 %    Platelets      140 - 400 Thousand/uL    MPV      7.5 - 12.5 fL    Neutrophils Absolute      1,500 - 7,800 cells/uL    Lymph #      850 - 3,900 cells/uL    Mono #      200 - 950 cells/uL    Eos #      15 - 500 cells/uL    Baso #      0 - 200 cells/uL    Neutrophils Relative      %    Lymph%      %    Mono%      %    Eosinophil%      %    Basophil%      %    Glucose      65 - 99 mg/dL 114 (H)   BUN, Bld      7 - 25 mg/dL 17   Creatinine      0.50 - 1.05 mg/dL 0.65   eGFR if non       > OR = 60 mL/min/1.73m2 102   eGFR if       > OR = 60 mL/min/1.73m2 118   BUN/Creatinine Ratio      6 - 22 (calc) NOT APPLICABLE   Sodium      135 - 146 mmol/L 140   Potassium      3.5 - 5.3 mmol/L 4.0   Chloride      98 - 110 mmol/L 106   CO2      20 - 32 mmol/L 26   Calcium      8.6 - 10.4 mg/dL 9.1   PROTEIN TOTAL      6.1 - 8.1 g/dL 6.4   Albumin      3.6 - 5.1 g/dL 4.0   Globulin, Total      1.9 - 3.7 g/dL (calc) 2.4   Albumin/Globulin Ratio      1.0 - 2.5 (calc) 1.7   BILIRUBIN TOTAL      0.2 - 1.2 mg/dL 0.8   Alkaline Phosphatase      37 - 153 U/L 96   AST      10 - 35 U/L 190 (H)   ALT      6 - 29 U/L 272 (H)   Cholesterol      <200 mg/dL 225 (H)   HDL      > OR = 50 mg/dL 49 (L)   Triglycerides      <150 mg/dL 127   LDL Cholesterol External      mg/dL (calc) 151 (H)   Hdl/Cholesterol Ratio      <5.0 (calc) 4.6   Non HDL Chol. (LDL+VLDL)      <130 mg/dL (calc) 176 (H)   TSH      mIU/L    Hemoglobin A1C External      <5.7 % of total Hgb        Current Outpatient Medications   Medication Sig Dispense Refill    etodolac (LODINE) 400 MG tablet TAKE 1 TABLET BY MOUTH TWICE A DAY 60 tablet 0    methocarbamol (ROBAXIN) 750 MG Tab Take 1 tablet (750 mg total) by mouth 2 (two) times daily as needed. 40 tablet 0    valACYclovir (VALTREX) 500 MG tablet TAKE 1  TABLET TWICE A  tablet 3     No current facility-administered medications for this visit.        Past Medical History:   Diagnosis Date    ADD (attention deficit disorder with hyperactivity)     Back pain     Fatty liver 7/27/2019    Herpes labialis     History of gestational diabetes     HLD (hyperlipidemia) 3/13/2013    Impaired fasting glucose 3/13/2013    Recurrent cold sores     Sciatica        Past Surgical History:   Procedure Laterality Date    APPENDECTOMY      HERNIA REPAIR  02/25/16    TONSILLECTOMY         Family History   Problem Relation Age of Onset    Diabetes Paternal Grandmother     Pancreatic cancer Mother         passed aage 48    Pancreatic cancer Father         passed age 81    Hyperlipidemia Father     Hypertension Father     Gout Father     Cancer Sister         passed away lung cancer age 54    Hyperlipidemia Sister     Hypertension Sister     Heart disease Sister     Coronary artery disease Neg Hx        Social History     Socioeconomic History    Marital status:      Spouse name: Not on file    Number of children: Not on file    Years of education: Not on file    Highest education level: Not on file   Occupational History    Not on file   Social Needs    Financial resource strain: Not very hard    Food insecurity:     Worry: Never true     Inability: Never true    Transportation needs:     Medical: No     Non-medical: No   Tobacco Use    Smoking status: Never Smoker    Smokeless tobacco: Never Used   Substance and Sexual Activity    Alcohol use: No     Frequency: Never     Drinks per session: Patient refused     Binge frequency: Patient refused    Drug use: No    Sexual activity: Not Currently     Birth control/protection: Post-menopausal   Lifestyle    Physical activity:     Days per week: 5 days     Minutes per session: 20 min    Stress: Only a little   Relationships    Social connections:     Talks on phone: More than three times a  "week     Gets together: Patient refused     Attends [employer] service: Not on file     Active member of club or organization: No     Attends meetings of clubs or organizations: Patient refused     Relationship status:    Other Topics Concern    Not on file   Social History Narrative    Not on file       Review of Systems   Constitutional: Negative for activity change and unexpected weight change.   HENT: Negative for hearing loss, rhinorrhea and trouble swallowing.    Eyes: Negative for discharge and visual disturbance.   Respiratory: Negative for chest tightness and wheezing.    Cardiovascular: Negative for chest pain and palpitations.   Gastrointestinal: Negative for blood in stool, constipation, diarrhea and vomiting.   Endocrine: Negative for polydipsia and polyuria.   Genitourinary: Negative for difficulty urinating, dysuria, hematuria and menstrual problem.   Musculoskeletal: Negative for arthralgias, joint swelling and neck pain.   Neurological: Negative for weakness and headaches.   Psychiatric/Behavioral: Negative for confusion and dysphoric mood.         Objective:     Vitals:    03/17/20 0817   BP: 118/82   BP Location: Right arm   Patient Position: Sitting   BP Method: Medium (Manual)   Pulse: 83   Resp: 16   Temp: 98.2 °F (36.8 °C)   TempSrc: Oral   SpO2: (!) 94%   Weight: 76.4 kg (168 lb 5.1 oz)   Height: 5' 5" (1.651 m)          Physical Exam   Constitutional: She is oriented to person, place, and time. She appears well-developed and well-nourished.   + obesity with Body mass index is 28.01 kg/m².       HENT:   Head: Normocephalic and atraumatic.   Right Ear: External ear normal.   Left Ear: External ear normal.   Nose: Nose normal.   Mouth/Throat: Oropharynx is clear and moist. No oropharyngeal exudate.   Eyes: Pupils are equal, round, and reactive to light. EOM are normal.   Neck: Normal range of motion. Neck supple. No tracheal deviation present. No thyromegaly present.   Cardiovascular: " Normal rate, regular rhythm and normal heart sounds.   No murmur heard.  Pulmonary/Chest: Effort normal and breath sounds normal. No respiratory distress.   Abdominal: Soft. She exhibits no distension.   Musculoskeletal: Normal range of motion. She exhibits no edema.   Lymphadenopathy:     She has no cervical adenopathy.   Neurological: She is alert and oriented to person, place, and time. No cranial nerve deficit. Coordination normal.   Skin: Skin is warm and dry. No rash noted.   Psychiatric: She has a normal mood and affect.         Assessment:         ICD-10-CM ICD-9-CM   1. Annual physical exam Z00.00 V70.0   2. Nonalcoholic steatohepatitis (LANDAVERDE) K75.81 571.8   3. Liver fibrosis K74.0 571.5   4. Hyperlipidemia, mixed E78.2 272.2   5. Screening for deficiency anemia Z13.0 V78.1   6. Thyroid disorder screen Z13.29 V77.0   7. Diabetes mellitus screening Z13.1 V77.1   8. Screening for HIV without presence of risk factors Z11.4 V73.89       Plan:       Annual physical exam  -  Declined flu vaccine and HIV screen is scheduled for next year.  Health Maintenance Summary     Influenza Vaccine Postponed 10/1/2020 Originally 9/1/2019. Patient Refused    Done 9/25/2017 Imm Admin: Influenza - Quadrivalent - PF (6 months and older)    Done 9/25/2017 Imm Admin: Influenza - Quadrivalent    Done 10/6/2015 Imm Admin: Influenza - Quadrivalent - PF (6 months and older)    Done 9/24/2014 Imm Admin: Influenza   HIV Screening Postponed 3/8/2021 Originally 8/15/1981. Patient Scheduled   Fecal Occult Blood Test (FOBT)/[facility] Next Due 3/19/2020     Done 3/19/2019 FECAL IMMUNOCHEMICAL TEST (IFOBT)   Mammogram Next Due 3/10/2022     Done 3/10/2020 MAMMO DIGITAL SCREENING BILAT WITH TOMOSYNTHESIS_CAD    Done 3/8/2019 MAMMO DIGITAL SCREENING BILAT WITH TOMOSYNTHESIS_CAD    Done 9/15/2016 MAMMO DIGITAL SCREENING BILAT WITH CAD    Done 3/15/2013 MAMMO DIGITAL SCREENING BILAT WITH CAD   Pap Smear with HPV Cotest Next Due 4/4/2022     Done  4/4/2019 LIQUID-BASED PAP SMEAR, SCREENING    Done 3/29/2016     Done 4/6/2015 [facility]: FINDINGS - EXTERNAL LAB DATE - PAP SMEAR    Done 1/6/2015 [facility]: FINDINGS - EXTERNAL LAB DATE - PAP SMEAR   Lipid Panel Next Due 3/10/2025     Done 3/10/2020 LIPID PANEL     Done 3/8/2019 LIPID PANEL     Done 4/27/2018 LIPID PANEL     Done 2/19/2016     Done 3/6/2013 LIPID PANEL     Patient has more history with this topic...   TETANUS VACCINE Next Due 10/7/2025     Done 10/7/2015 Imm Admin: Tdap    Done 11/13/2009 Imm Admin: Tdap   Shingles Vaccine This plan is no longer active.     Done 8/28/2018 Imm Admin: Zoster Recombinant    Done 5/9/2018 Imm Admin: Zoster Recombinant   Pneumococcal Vaccine (Medium Risk) This plan is no longer active.     Done 3/12/2019 Imm Admin: Pneumococcal Polysaccharide - 23 Valent       -     CBC auto differential; Future; Expected date: 03/01/2021  -     Comprehensive metabolic panel; Future; Expected date: 03/01/2021  -     Lipid panel; Future; Expected date: 03/01/2021  -     TSH; Future; Expected date: 03/01/2021  -     HIV 1/2 Ag/Ab (4th Gen); Future; Expected date: 03/01/2021    Nonalcoholic steatohepatitis (LANDAVERDE)  -  Follow up with hepatology and get further testing as recommended    Liver fibrosis  -  Follow up with hepatology and get further testing as recommended    Hyperlipidemia, mixed  -  Continue to work on lifestyle modifications.  -     Lipid panel; Future; Expected date: 03/01/2021    Screening for deficiency anemia  -     Cancel: CBC auto differential; Future; Expected date: 03/01/2021  -     CBC auto differential; Future; Expected date: 03/01/2021    Thyroid disorder screen  -     Cancel: TSH; Future; Expected date: 03/01/2021  -     TSH; Future; Expected date: 03/01/2021    Diabetes mellitus screening  -     Cancel: Comprehensive metabolic panel; Future; Expected date: 03/01/2021  -     Comprehensive metabolic panel; Future; Expected date: 03/01/2021    Screening for HIV  without presence of risk factors  -     Cancel: HIV 1/2 Ag/Ab (4th Gen); Future; Expected date: 03/01/2021  -     HIV 1/2 Ag/Ab (4th Gen); Future; Expected date: 03/01/2021      Follow up in about 1 year (around 3/17/2021) for fasting labs at [facility] and wellness in 1 year.     Patient's Medications   New Prescriptions    No medications on file   Previous Medications    ETODOLAC (LODINE) 400 MG TABLET    TAKE 1 TABLET BY MOUTH TWICE A DAY    METHOCARBAMOL (ROBAXIN) 750 MG TAB    Take 1 tablet (750 mg total) by mouth 2 (two) times daily as needed.    VALACYCLOVIR (VALTREX) 500 MG TABLET    TAKE 1 TABLET TWICE A DAY   Modified Medications    No medications on file   Discontinued Medications    No medications on file

## 2020-03-19 DIAGNOSIS — S46.911A MUSCLE STRAIN OF RIGHT UPPER ARM, INITIAL ENCOUNTER: ICD-10-CM

## 2020-03-19 RX ORDER — METHOCARBAMOL 750 MG/1
750 TABLET, FILM COATED ORAL 2 TIMES DAILY PRN
Qty: 40 TABLET | Refills: 0 | Status: SHIPPED | OUTPATIENT
Start: 2020-03-19 | End: 2021-04-08

## 2020-04-29 ENCOUNTER — PATIENT OUTREACH (OUTPATIENT)
Dept: ADMINISTRATIVE | Facility: HOSPITAL | Age: 55
End: 2020-04-29

## 2020-07-23 ENCOUNTER — PATIENT MESSAGE (OUTPATIENT)
Dept: FAMILY MEDICINE | Facility: CLINIC | Age: 55
End: 2020-07-23

## 2020-07-23 RX ORDER — FLUTICASONE PROPIONATE 50 MCG
2 SPRAY, SUSPENSION (ML) NASAL DAILY
Qty: 48 G | Refills: 3 | Status: SHIPPED | OUTPATIENT
Start: 2020-07-23 | End: 2021-04-08 | Stop reason: SDUPTHER

## 2020-10-19 ENCOUNTER — PATIENT MESSAGE (OUTPATIENT)
Dept: FAMILY MEDICINE | Facility: CLINIC | Age: 55
End: 2020-10-19

## 2020-10-30 ENCOUNTER — PATIENT MESSAGE (OUTPATIENT)
Dept: ADMINISTRATIVE | Facility: HOSPITAL | Age: 55
End: 2020-10-30

## 2021-01-04 ENCOUNTER — PATIENT MESSAGE (OUTPATIENT)
Dept: ADMINISTRATIVE | Facility: HOSPITAL | Age: 56
End: 2021-01-04

## 2021-03-09 ENCOUNTER — PATIENT MESSAGE (OUTPATIENT)
Dept: FAMILY MEDICINE | Facility: CLINIC | Age: 56
End: 2021-03-09

## 2021-03-25 ENCOUNTER — PATIENT OUTREACH (OUTPATIENT)
Dept: ADMINISTRATIVE | Facility: HOSPITAL | Age: 56
End: 2021-03-25

## 2021-03-25 DIAGNOSIS — Z12.11 SCREENING FOR COLON CANCER: Primary | ICD-10-CM

## 2021-03-25 DIAGNOSIS — Z12.31 SCREENING MAMMOGRAM, ENCOUNTER FOR: ICD-10-CM

## 2021-03-31 ENCOUNTER — PATIENT MESSAGE (OUTPATIENT)
Dept: FAMILY MEDICINE | Facility: CLINIC | Age: 56
End: 2021-03-31

## 2021-03-31 ENCOUNTER — TELEPHONE (OUTPATIENT)
Dept: FAMILY MEDICINE | Facility: CLINIC | Age: 56
End: 2021-03-31

## 2021-04-08 ENCOUNTER — PATIENT MESSAGE (OUTPATIENT)
Dept: FAMILY MEDICINE | Facility: CLINIC | Age: 56
End: 2021-04-08

## 2021-04-08 ENCOUNTER — OFFICE VISIT (OUTPATIENT)
Dept: FAMILY MEDICINE | Facility: CLINIC | Age: 56
End: 2021-04-08
Payer: COMMERCIAL

## 2021-04-08 VITALS
BODY MASS INDEX: 27.49 KG/M2 | HEIGHT: 65 IN | SYSTOLIC BLOOD PRESSURE: 126 MMHG | HEART RATE: 75 BPM | RESPIRATION RATE: 16 BRPM | OXYGEN SATURATION: 97 % | TEMPERATURE: 97 F | WEIGHT: 165 LBS | DIASTOLIC BLOOD PRESSURE: 78 MMHG

## 2021-04-08 DIAGNOSIS — B00.1 RECURRENT COLD SORES: ICD-10-CM

## 2021-04-08 DIAGNOSIS — K75.81 NONALCOHOLIC STEATOHEPATITIS (NASH): ICD-10-CM

## 2021-04-08 DIAGNOSIS — Z12.11 COLON CANCER SCREENING: ICD-10-CM

## 2021-04-08 DIAGNOSIS — Z00.00 ANNUAL PHYSICAL EXAM: Primary | ICD-10-CM

## 2021-04-08 DIAGNOSIS — K74.00 LIVER FIBROSIS: ICD-10-CM

## 2021-04-08 DIAGNOSIS — J30.89 ENVIRONMENTAL AND SEASONAL ALLERGIES: ICD-10-CM

## 2021-04-08 DIAGNOSIS — E78.2 HYPERLIPIDEMIA, MIXED: ICD-10-CM

## 2021-04-08 PROCEDURE — 99999 PR PBB SHADOW E&M-EST. PATIENT-LVL IV: ICD-10-PCS | Mod: PBBFAC,,, | Performed by: NURSE PRACTITIONER

## 2021-04-08 PROCEDURE — 99396 PR PREVENTIVE VISIT,EST,40-64: ICD-10-PCS | Mod: S$GLB,,, | Performed by: NURSE PRACTITIONER

## 2021-04-08 PROCEDURE — 99999 PR PBB SHADOW E&M-EST. PATIENT-LVL IV: CPT | Mod: PBBFAC,,, | Performed by: NURSE PRACTITIONER

## 2021-04-08 PROCEDURE — 99396 PREV VISIT EST AGE 40-64: CPT | Mod: S$GLB,,, | Performed by: NURSE PRACTITIONER

## 2021-04-08 RX ORDER — FLUTICASONE PROPIONATE 50 MCG
2 SPRAY, SUSPENSION (ML) NASAL DAILY
Qty: 48 G | Refills: 3 | Status: SHIPPED | OUTPATIENT
Start: 2021-04-08

## 2021-04-08 RX ORDER — VALACYCLOVIR HYDROCHLORIDE 500 MG/1
500 TABLET, FILM COATED ORAL 2 TIMES DAILY
Qty: 180 TABLET | Refills: 3 | Status: SHIPPED | OUTPATIENT
Start: 2021-04-08 | End: 2022-04-13

## 2021-04-08 RX ORDER — MUPIROCIN 20 MG/G
OINTMENT TOPICAL 2 TIMES DAILY
Qty: 22 G | Refills: 1 | Status: SHIPPED | OUTPATIENT
Start: 2021-04-08

## 2021-05-04 ENCOUNTER — PATIENT MESSAGE (OUTPATIENT)
Dept: RESEARCH | Facility: HOSPITAL | Age: 56
End: 2021-05-04

## 2021-07-07 ENCOUNTER — PATIENT MESSAGE (OUTPATIENT)
Dept: ADMINISTRATIVE | Facility: HOSPITAL | Age: 56
End: 2021-07-07

## 2021-07-19 ENCOUNTER — LAB VISIT (OUTPATIENT)
Dept: LAB | Facility: HOSPITAL | Age: 56
End: 2021-07-19
Payer: COMMERCIAL

## 2021-07-19 DIAGNOSIS — Z12.11 COLON CANCER SCREENING: ICD-10-CM

## 2021-07-19 PROCEDURE — 82274 ASSAY TEST FOR BLOOD FECAL: CPT | Performed by: NURSE PRACTITIONER

## 2021-07-27 LAB — HEMOCCULT STL QL IA: NEGATIVE

## 2022-05-31 ENCOUNTER — PATIENT MESSAGE (OUTPATIENT)
Dept: ADMINISTRATIVE | Facility: HOSPITAL | Age: 57
End: 2022-05-31
Payer: COMMERCIAL

## 2022-10-31 NOTE — PATIENT INSTRUCTIONS
1. Get vaccinated for hepatitis A and B  2. Will prescreen for clinical trials  [id number]   Minocycline Counseling: Patient advised regarding possible photosensitivity and discoloration of the teeth, skin, lips, tongue and gums.  Patient instructed to avoid sunlight, if possible.  When exposed to sunlight, patients should wear protective clothing, sunglasses, and sunscreen.  The patient was instructed to call the office immediately if the following severe adverse effects occur:  hearing changes, easy bruising/bleeding, severe headache, or vision changes.  The patient verbalized understanding of the proper use and possible adverse effects of minocycline.  All of the patient's questions and concerns were addressed.

## 2023-04-28 ENCOUNTER — OFFICE VISIT (OUTPATIENT)
Dept: OBSTETRICS AND GYNECOLOGY | Facility: CLINIC | Age: 58
End: 2023-04-28
Payer: COMMERCIAL

## 2023-04-28 VITALS
SYSTOLIC BLOOD PRESSURE: 117 MMHG | DIASTOLIC BLOOD PRESSURE: 77 MMHG | WEIGHT: 172.81 LBS | BODY MASS INDEX: 28.76 KG/M2

## 2023-04-28 DIAGNOSIS — Z12.31 ENCOUNTER FOR SCREENING MAMMOGRAM FOR BREAST CANCER: ICD-10-CM

## 2023-04-28 DIAGNOSIS — Z12.4 SCREENING FOR CERVICAL CANCER: ICD-10-CM

## 2023-04-28 DIAGNOSIS — Z80.0 FAMILY HISTORY OF PANCREATIC CANCER: ICD-10-CM

## 2023-04-28 DIAGNOSIS — N39.0 URINARY TRACT INFECTION WITHOUT HEMATURIA, SITE UNSPECIFIED: Primary | ICD-10-CM

## 2023-04-28 PROCEDURE — 99214 PR OFFICE/OUTPT VISIT, EST, LEVL IV, 30-39 MIN: ICD-10-PCS | Mod: S$GLB,,, | Performed by: OBSTETRICS & GYNECOLOGY

## 2023-04-28 PROCEDURE — 99214 OFFICE O/P EST MOD 30 MIN: CPT | Mod: S$GLB,,, | Performed by: OBSTETRICS & GYNECOLOGY

## 2023-04-28 PROCEDURE — 99999 PR PBB SHADOW E&M-EST. PATIENT-LVL III: CPT | Mod: PBBFAC,,, | Performed by: OBSTETRICS & GYNECOLOGY

## 2023-04-28 PROCEDURE — 88175 CYTOPATH C/V AUTO FLUID REDO: CPT | Performed by: OBSTETRICS & GYNECOLOGY

## 2023-04-28 PROCEDURE — 99999 PR PBB SHADOW E&M-EST. PATIENT-LVL III: ICD-10-PCS | Mod: PBBFAC,,, | Performed by: OBSTETRICS & GYNECOLOGY

## 2023-04-28 RX ORDER — AMOXICILLIN 500 MG/1
500 CAPSULE ORAL EVERY 8 HOURS
COMMUNITY
Start: 2023-04-24

## 2023-04-28 NOTE — PROGRESS NOTES
GYNECOLOGY OFFICE NOTE    Reason for visit: vaginal and back pain    HPI: Pt is a 57 y.o.  female  who presents for evaluation of vaginal and back pain. Had a random sharp shooting  in vagina. Was recently diagnosed with GBS UTI at a [facility] clinic and was prescribed amoxicillin (still taking). Last annual in . Reported menopause at age 46. Menarche: 16. She is sexually active. She does not desire STI screening. She denies vaginal discharge.  Last pap: 2019-negative pap/hpv, denies hx of abnormal. Last MMG 2021- negative.  Denies pelvic pain or vaginal bleeding.       Past Medical History:   Diagnosis Date    ADD (attention deficit disorder with hyperactivity)     Back pain     Fatty liver 2019    Herpes labialis     History of gestational diabetes     HLD (hyperlipidemia) 3/13/2013    Impaired fasting glucose 3/13/2013    Recurrent cold sores     Sciatica        Past Surgical History:   Procedure Laterality Date    APPENDECTOMY      HERNIA REPAIR  16    TONSILLECTOMY         Family History   Problem Relation Age of Onset    Pancreatic cancer Mother         passed aage 48    Pancreatic cancer Father         passed age 81    Hyperlipidemia Father     Hypertension Father     Gout Father     Lung cancer Sister     Hyperlipidemia Sister     Hypertension Sister     Heart disease Sister     Lung cancer Sister 62    Hypertension Brother     Hyperlipidemia Brother     Ulcerative colitis Brother     Diabetes Brother     Hypertension Brother     Obesity Brother     Diabetes Paternal Grandmother     Coronary artery disease Neg Hx        Social History     Tobacco Use    Smoking status: Never    Smokeless tobacco: Never   Substance Use Topics    Alcohol use: No    Drug use: No       OB History    Para Term  AB Living   4 3 2 1 1 3   SAB IAB Ectopic Multiple Live Births   1       3      # Outcome Date GA Lbr Marc/2nd Weight Sex Delivery Anes PTL Lv   4 Term     F Vag-Spont   JULY    3   37w0d   F Vag-Spont   JULY   2 Term     M Vag-Spont   JULY   1 SAB  12w0d              Current Outpatient Medications   Medication Sig    amoxicillin (AMOXIL) 500 MG capsule Take 500 mg by mouth every 8 (eight) hours.    etodolac (LODINE) 400 MG tablet TAKE 1 TABLET BY MOUTH TWICE A DAY    fluticasone propionate (FLONASE) 50 mcg/actuation nasal spray 2 sprays (100 mcg total) by Each Nostril route once daily.    mupirocin (BACTROBAN) 2 % ointment Apply topically 2 (two) times daily.    naproxen (NAPROSYN) 500 MG tablet Take 1 tablet (500 mg total) by mouth 2 (two) times daily with meals.    valACYclovir (VALTREX) 500 MG tablet TAKE 1 TABLET BY MOUTH TWICE A DAY     No current facility-administered medications for this visit.       Allergies: Patient has no known allergies.     /77   Wt 78.4 kg (172 lb 12.8 oz)   LMP  (LMP Unknown)   BMI 28.76 kg/m²     ROS:  GENERAL: Denies fever or chills.   SKIN: Denies rash or lesions.   HEAD: Denies head injury or headache.   CHEST: Denies chest pain or shortness of breath.   CARDIOVASCULAR: Denies palpitations or chest pain.   ABDOMEN: No constipation, diarrhea, nausea, vomiting or rectal bleeding.   URINARY: No dysuria, hematuria, or burning on urination.  REPRODUCTIVE: See HPI.   BREASTS: see HPI  NEUROLOGIC: Denies syncope or weakness.     Physical Exam:  GENERAL: alert, appears stated age and cooperative  NEUROLOGIC: orientated to person, place and time, normal mood and affect   CHEST: Normal respiratory effort  NECK: normal appearance  SKIN: no acne, hirsutism  BREAST EXAM: breasts appear normal, no suspicious masses, no skin or nipple changes or axillary nodes  ABDOMEN: abdomen is soft without significant tenderness, masses  EXTERNAL GENITALIA:  normal general appearance  URETHRA: normal urethra, normal urethral meatus  VAGINA:  normal mucosa, no  lesions  CERVIX:  Normal  UTERUS:  mobile, non tender  ADNEXA: nontender    Diagnosis:  1.  Urinary tract infection without hematuria, site unspecified    2. Screening for cervical cancer    3. Encounter for screening mammogram for breast cancer    4. Family history of pancreatic cancer        Plan:   1. Continue therapy as prescribed- given precautions for any persistent or worsening symptoms  2. Pap today  3. MMG ordered  4. Referral placed    Orders Placed This Encounter    Mammo Digital Screening Bilat w/ [facility]    Ambulatory referral/consult to Genetics    Liquid-Based Pap Smear, Screening             Kasey Smith MD  OB/GYN

## 2023-05-01 ENCOUNTER — PATIENT MESSAGE (OUTPATIENT)
Dept: HEMATOLOGY/ONCOLOGY | Facility: CLINIC | Age: 58
End: 2023-05-01
Payer: COMMERCIAL

## 2023-05-06 LAB
FINAL PATHOLOGIC DIAGNOSIS: NORMAL
Lab: NORMAL

## 2023-05-08 ENCOUNTER — TELEPHONE (OUTPATIENT)
Dept: OBSTETRICS AND GYNECOLOGY | Facility: CLINIC | Age: 58
End: 2023-05-08
Payer: COMMERCIAL

## 2023-08-02 ENCOUNTER — TELEPHONE (OUTPATIENT)
Dept: HEMATOLOGY/ONCOLOGY | Facility: CLINIC | Age: 58
End: 2023-08-02
Payer: COMMERCIAL

## 2023-08-02 NOTE — TELEPHONE ENCOUNTER
Spoke with Niharika about scheduling cancer genetic consult, she is interested but doesn't know her upcoming work schedule. She should receive it tomorrow, so she would like a call back in the afternoon then. Reminder set to call back tomorrow.

## 2023-08-03 ENCOUNTER — TELEPHONE (OUTPATIENT)
Dept: HEMATOLOGY/ONCOLOGY | Facility: CLINIC | Age: 58
End: 2023-08-03
Payer: COMMERCIAL

## 2023-08-03 NOTE — TELEPHONE ENCOUNTER
Called and scheduled for Tuesday, 8/15 at 12pm due to her difficult work schedule. Confirmed location and appt info. She voiced thanks and understanding.    ----- Message from Halle Atkinson MA sent at 8/2/2023  1:13 PM CDT -----  She should have her work schedule, call back to make appt.

## 2023-08-11 ENCOUNTER — PATIENT MESSAGE (OUTPATIENT)
Dept: HEMATOLOGY/ONCOLOGY | Facility: CLINIC | Age: 58
End: 2023-08-11
Payer: COMMERCIAL

## 2023-09-11 ENCOUNTER — PATIENT MESSAGE (OUTPATIENT)
Dept: HEMATOLOGY/ONCOLOGY | Facility: CLINIC | Age: 58
End: 2023-09-11
Payer: COMMERCIAL

## 2025-08-06 ENCOUNTER — TELEPHONE (OUTPATIENT)
Dept: FAMILY MEDICINE | Facility: CLINIC | Age: 60
End: 2025-08-06
Payer: COMMERCIAL

## 2025-08-06 DIAGNOSIS — Z00.00 ENCOUNTER FOR BLOOD TEST FOR ROUTINE GENERAL PHYSICAL EXAMINATION: Primary | ICD-10-CM

## 2025-08-06 DIAGNOSIS — Z13.0 SCREENING FOR DEFICIENCY ANEMIA: ICD-10-CM

## 2025-08-06 DIAGNOSIS — Z13.1 DIABETES MELLITUS SCREENING: ICD-10-CM

## 2025-08-06 DIAGNOSIS — Z13.29 THYROID DISORDER SCREEN: ICD-10-CM

## 2025-08-06 DIAGNOSIS — Z13.220 SCREENING CHOLESTEROL LEVEL: ICD-10-CM

## 2025-08-06 NOTE — TELEPHONE ENCOUNTER
Patient has NEW patient wellness scheduled for 8/12/2025 - call patient to set up for FASTING LABS before visit so we can go over results at time of visit.

## 2025-08-12 ENCOUNTER — OFFICE VISIT (OUTPATIENT)
Dept: FAMILY MEDICINE | Facility: CLINIC | Age: 60
End: 2025-08-12
Payer: COMMERCIAL

## 2025-08-12 VITALS
HEIGHT: 65 IN | WEIGHT: 176.13 LBS | HEART RATE: 76 BPM | BODY MASS INDEX: 29.34 KG/M2 | TEMPERATURE: 98 F | SYSTOLIC BLOOD PRESSURE: 136 MMHG | OXYGEN SATURATION: 98 % | DIASTOLIC BLOOD PRESSURE: 86 MMHG

## 2025-08-12 DIAGNOSIS — Z12.31 ENCOUNTER FOR SCREENING MAMMOGRAM FOR MALIGNANT NEOPLASM OF BREAST: ICD-10-CM

## 2025-08-12 DIAGNOSIS — Z00.00 ENCOUNTER FOR BLOOD TEST FOR ROUTINE GENERAL PHYSICAL EXAMINATION: ICD-10-CM

## 2025-08-12 DIAGNOSIS — Z12.11 COLON CANCER SCREENING: ICD-10-CM

## 2025-08-12 DIAGNOSIS — K74.00 LIVER FIBROSIS: ICD-10-CM

## 2025-08-12 DIAGNOSIS — K75.81 NONALCOHOLIC STEATOHEPATITIS (NASH): ICD-10-CM

## 2025-08-12 DIAGNOSIS — Z13.29 THYROID DISORDER SCREEN: ICD-10-CM

## 2025-08-12 DIAGNOSIS — Z13.1 DIABETES MELLITUS SCREENING: ICD-10-CM

## 2025-08-12 DIAGNOSIS — Z00.00 ANNUAL PHYSICAL EXAM: Primary | ICD-10-CM

## 2025-08-12 DIAGNOSIS — E78.5 MILD HYPERLIPIDEMIA: ICD-10-CM

## 2025-08-12 DIAGNOSIS — T46.6X5A STATIN MYOPATHY: ICD-10-CM

## 2025-08-12 DIAGNOSIS — Z13.0 SCREENING FOR DEFICIENCY ANEMIA: ICD-10-CM

## 2025-08-12 DIAGNOSIS — B00.1 RECURRENT COLD SORES: ICD-10-CM

## 2025-08-12 DIAGNOSIS — G72.0 STATIN MYOPATHY: ICD-10-CM

## 2025-08-12 PROCEDURE — 99386 PREV VISIT NEW AGE 40-64: CPT | Mod: S$GLB,,, | Performed by: NURSE PRACTITIONER

## 2025-08-12 PROCEDURE — 99999 PR PBB SHADOW E&M-EST. PATIENT-LVL IV: CPT | Mod: PBBFAC,,, | Performed by: NURSE PRACTITIONER

## 2025-08-12 RX ORDER — VALACYCLOVIR HYDROCHLORIDE 500 MG/1
500 TABLET, FILM COATED ORAL 2 TIMES DAILY PRN
Qty: 60 TABLET | Refills: 3 | Status: SHIPPED | OUTPATIENT
Start: 2025-08-12